# Patient Record
Sex: MALE | Race: WHITE | NOT HISPANIC OR LATINO
[De-identification: names, ages, dates, MRNs, and addresses within clinical notes are randomized per-mention and may not be internally consistent; named-entity substitution may affect disease eponyms.]

---

## 2018-12-24 ENCOUNTER — RECORD ABSTRACTING (OUTPATIENT)
Age: 53
End: 2018-12-24

## 2018-12-24 DIAGNOSIS — Z82.49 FAMILY HISTORY OF ISCHEMIC HEART DISEASE AND OTHER DISEASES OF THE CIRCULATORY SYSTEM: ICD-10-CM

## 2018-12-24 DIAGNOSIS — Z80.1 FAMILY HISTORY OF MALIGNANT NEOPLASM OF TRACHEA, BRONCHUS AND LUNG: ICD-10-CM

## 2019-01-02 ENCOUNTER — RECORD ABSTRACTING (OUTPATIENT)
Age: 54
End: 2019-01-02

## 2019-01-02 DIAGNOSIS — Z87.898 PERSONAL HISTORY OF OTHER SPECIFIED CONDITIONS: ICD-10-CM

## 2019-01-02 DIAGNOSIS — I10 ESSENTIAL (PRIMARY) HYPERTENSION: ICD-10-CM

## 2019-01-02 DIAGNOSIS — I24.9 ACUTE ISCHEMIC HEART DISEASE, UNSPECIFIED: ICD-10-CM

## 2019-01-02 DIAGNOSIS — Z99.89 DEPENDENCE ON OTHER ENABLING MACHINES AND DEVICES: ICD-10-CM

## 2019-01-02 DIAGNOSIS — Z78.9 OTHER SPECIFIED HEALTH STATUS: ICD-10-CM

## 2019-01-02 DIAGNOSIS — Z95.5 PRESENCE OF CORONARY ANGIOPLASTY IMPLANT AND GRAFT: ICD-10-CM

## 2019-01-02 DIAGNOSIS — E78.49 OTHER HYPERLIPIDEMIA: ICD-10-CM

## 2019-01-02 DIAGNOSIS — J30.0 VASOMOTOR RHINITIS: ICD-10-CM

## 2019-01-10 ENCOUNTER — APPOINTMENT (OUTPATIENT)
Dept: CARDIOLOGY | Facility: CLINIC | Age: 54
End: 2019-01-10
Payer: COMMERCIAL

## 2019-01-10 ENCOUNTER — NON-APPOINTMENT (OUTPATIENT)
Age: 54
End: 2019-01-10

## 2019-01-10 VITALS
BODY MASS INDEX: 38.51 KG/M2 | DIASTOLIC BLOOD PRESSURE: 64 MMHG | HEIGHT: 69 IN | WEIGHT: 260 LBS | SYSTOLIC BLOOD PRESSURE: 138 MMHG

## 2019-01-10 PROCEDURE — 93000 ELECTROCARDIOGRAM COMPLETE: CPT

## 2019-01-10 PROCEDURE — 99214 OFFICE O/P EST MOD 30 MIN: CPT

## 2019-01-10 PROCEDURE — 36415 COLL VENOUS BLD VENIPUNCTURE: CPT

## 2019-01-10 NOTE — HISTORY OF PRESENT ILLNESS
[FreeTextEntry1] :  Follow up office visit, for this 53 year-old gentleman, son of long-term patient Tyrone Jaramillo, seen after Acte ACS in Sept 2018. \par        Scenario: He was hospitalized 9/21/18 at [facility] with chest pain, ST changes, and elevated\par        troponin. He was transferred to [place] for cath:\par        100% mid RCA; received MIRTA with excellent result and ANA 3 flow.\par        Severe mid--LXC stenosis; received MIRTA to mid--lcx with good result\par        60% distal LAD; not addressed as felt nonobstructive\par        LV: EF 40% with inferior hypokinesis\par        Discharged on Brilinta 90 BID, ASA 81 and statin.\par        ==============================================\par        Previously:\par        Johnny reported that he was suffering from 2 conditions:\par        1)The first is a cough: His been present for a year it is nonproductive. He saw his primary care physician who obtained a chest x-ray and this was unremarkable. He is a 2 pack per day smoker but there is no wheezing or mucus production. He feels as if he chokes on his own secretions. There but no fevers or chills and no mucus production. Any phlegm is clear. ** I referred him to Pulmonary (Dr. Elio Travis) who diagnosed Centrilobular EMPHYSEMA, Sleep Apnea, Obesity, and cigarette addiction. He recommended full PFTs, CT of chest, and pulm stress test.\par        2) The second issue is peripheral edema. He was tried on hydrochlorothiazide without much success without rash. Because of this rash, he self discontinued hydrochlorothiazide, metformin, and simvastatin. He did retry at all 3 medications and the rash recurred. He is therefore unsure which of the 3 medications produced a rash. We started LASIX and this has helped.\par        3) He does have a history of hyperlipidemia and was prescribed simvastatin which he had discontinued (as above).\par        4) He is a 2 pack per day lifelong smoker. Tried Chantix for 3 months;no success--back to 2-3 ppd\par        5) He does drink. He was formerly a heavy drinker, then stop for 20 years, but resumed several years ago.\par        6) Prior to his Sept 2018 ACS, and based on his risk factor profile (Male, smoker, htn, hyperlipidemia, and positive family hx of CAD in his father), I had recommended stress testing for risk stratification. 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 No acute pleural or pulmonary pathology.\par        ECHOCARDIOGRAM (9/27/17)\par        Mild LV dilatation with nl EF\par        Grade 1 diastolic dysfunction\par        No AS. Mild MR. No pulm Htn,\par        **Venous Duplex (obtained due to edema) 11/2/17: Nl study. No venous insuff or DVT.\par        ================================================\par        Today: Approx. 3.5 months s/p ACS and MIRTA to mid-rca for acute 100% stenosis and stenting of mid LCX. Doing well. Will obtain risk stratifying stress echo as post PCI baseline and to confirm LAD lesion is nonobstructive

## 2019-01-10 NOTE — REASON FOR VISIT
[FreeTextEntry1] : 1. Multivessel CAD. \par 2. S/P ACS, MIRTA to RCA and LCX 9/21/18.\par 3. Hyperlipidemia. \par 4. Essential Hypertension.

## 2019-01-10 NOTE — PHYSICAL EXAM
[Click] : no click [Right Carotid Bruit] : no bruit heard over the right carotid [Left Carotid Bruit] : no bruit heard over the left carotid [Right Femoral Bruit] : no bruit heard over the right femoral artery [Left Femoral Bruit] : no bruit heard over the left femoral artery [Rt] : no varicose veins of the right leg [Lt] : no varicose veins of the left leg

## 2019-01-10 NOTE — ASSESSMENT
[FreeTextEntry1] :  Assessment: \par 1. S/P drug eluting coronary stent placement - Z95.5 (Primary)  \par 2. SOB (shortness of breath) - R06.02  \par 3. 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 Obstructive sleep apnea (adult) (pediatric) - G47.33  \par 5. Obesity (BMI 30-39.9) - E66.9  \par 6. Smoker - F17.200  \par 7. Alcohol abuse - F10.10  \par 8. Mixed hyperlipidemia - E78.2   \par  1) CAD, 3.5 months s/p ACS and s/p MIRTA X2 to mid RCA and LCX, with known nonobstructive distal LAD stenosis and mild LV dysfunction. Asymptomatic and clinically stable. Will plan Stress Echo before next visit. Will consider adding BB due to LV dysfunction, but hx of smoking/COPD may preclude this. Will plan 12 months of DAP therapy, due to MIRTA and smoking!\par 2) Smoking addiction--very poor prognosis with obesity, CAD, and inability to quit. Smoking excessively, still.\par 3) Hyperlipidemia, on maximum statin\par 4) COPD--centrilobar emphysema\par 5) Alcohol/substance abuse\par Prognosis poor due to CAD involving all 3 arteries and presence of LV dysfunction and he is smoking. This is very ominous. I was very clear to him that his continued smoking would more likely than not result in restenosis and a devastating heart attack. His entire prognosis is dependent on his ability to quit smoking. I have offered him Chantix, patches, and recommended he see our smoking cessation physicians. \par

## 2019-01-11 LAB
ALBUMIN SERPL ELPH-MCNC: 4.3 G/DL
ALP BLD-CCNC: 97 U/L
ALT SERPL-CCNC: 24 U/L
ANION GAP SERPL CALC-SCNC: 9 MMOL/L
AST SERPL-CCNC: 17 U/L
BASOPHILS # BLD AUTO: 0.04 K/UL
BASOPHILS NFR BLD AUTO: 0.4 %
BILIRUB SERPL-MCNC: 0.3 MG/DL
BUN SERPL-MCNC: 10 MG/DL
CALCIUM SERPL-MCNC: 9.1 MG/DL
CHLORIDE SERPL-SCNC: 106 MMOL/L
CHOLEST SERPL-MCNC: 119 MG/DL
CHOLEST/HDLC SERPL: 4 RATIO
CO2 SERPL-SCNC: 24 MMOL/L
CREAT SERPL-MCNC: 0.89 MG/DL
EOSINOPHIL # BLD AUTO: 0.29 K/UL
EOSINOPHIL NFR BLD AUTO: 2.8 %
GLUCOSE SERPL-MCNC: 171 MG/DL
HCT VFR BLD CALC: 44.9 %
HDLC SERPL-MCNC: 30 MG/DL
HGB BLD-MCNC: 14.4 G/DL
IMM GRANULOCYTES NFR BLD AUTO: 0.4 %
LDLC SERPL CALC-MCNC: 54 MG/DL
LYMPHOCYTES # BLD AUTO: 2.62 K/UL
LYMPHOCYTES NFR BLD AUTO: 25.5 %
MAN DIFF?: NORMAL
MCHC RBC-ENTMCNC: 31.3 PG
MCHC RBC-ENTMCNC: 32.1 GM/DL
MCV RBC AUTO: 97.6 FL
MONOCYTES # BLD AUTO: 0.52 K/UL
MONOCYTES NFR BLD AUTO: 5.1 %
NEUTROPHILS # BLD AUTO: 6.76 K/UL
NEUTROPHILS NFR BLD AUTO: 65.8 %
PLATELET # BLD AUTO: 202 K/UL
POTASSIUM SERPL-SCNC: 4.1 MMOL/L
PROT SERPL-MCNC: 6.8 G/DL
RBC # BLD: 4.6 M/UL
RBC # FLD: 14 %
SODIUM SERPL-SCNC: 139 MMOL/L
TRIGL SERPL-MCNC: 176 MG/DL
WBC # FLD AUTO: 10.27 K/UL

## 2019-04-04 RX ORDER — ASPIRIN ENTERIC COATED TABLETS 81 MG 81 MG/1
81 TABLET, DELAYED RELEASE ORAL DAILY
Refills: 0 | Status: ACTIVE | COMMUNITY

## 2019-04-04 NOTE — PHYSICAL EXAM
[General Appearance - Well Developed] : well developed [Normal Appearance] : normal appearance [Well Groomed] : well groomed [General Appearance - Well Nourished] : well nourished [No Deformities] : no deformities [General Appearance - In No Acute Distress] : no acute distress [Normal Conjunctiva] : the conjunctiva exhibited no abnormalities [Eyelids - No Xanthelasma] : the eyelids demonstrated no xanthelasmas [Normal Oral Mucosa] : normal oral mucosa [No Oral Pallor] : no oral pallor [No Oral Cyanosis] : no oral cyanosis [Normal Jugular Venous A Waves Present] : normal jugular venous A waves present [Normal Jugular Venous V Waves Present] : normal jugular venous V waves present [No Jugular Venous Sims A Waves] : no jugular venous sims A waves [Respiration, Rhythm And Depth] : normal respiratory rhythm and effort [Exaggerated Use Of Accessory Muscles For Inspiration] : no accessory muscle use [Auscultation Breath Sounds / Voice Sounds] : lungs were clear to auscultation bilaterally [Abdomen Soft] : soft [Abdomen Tenderness] : non-tender [Abdomen Mass (___ Cm)] : no abdominal mass palpated [Abnormal Walk] : normal gait [Gait - Sufficient For Exercise Testing] : the gait was sufficient for exercise testing [Nail Clubbing] : no clubbing of the fingernails [Cyanosis, Localized] : no localized cyanosis [Petechial Hemorrhages (___cm)] : no petechial hemorrhages [Skin Color & Pigmentation] : normal skin color and pigmentation [] : no rash [No Venous Stasis] : no venous stasis [Skin Lesions] : no skin lesions [No Skin Ulcers] : no skin ulcer [No Xanthoma] : no  xanthoma was observed [5th Left ICS - MCL] : palpated at the 5th LICS in the midclavicular line [Normal] : normal [Normal Rate] : normal [Rhythm Regular] : regular [Normal S1] : normal S1 [Normal S2] : normal S2 [No Murmur] : no murmurs heard [No Abnormalities] : the abdominal aorta was not enlarged and no bruit was heard [No Pitting Edema] : no pitting edema present [Click] : no click [Right Carotid Bruit] : no bruit heard over the right carotid [Left Carotid Bruit] : no bruit heard over the left carotid [Right Femoral Bruit] : no bruit heard over the right femoral artery [Left Femoral Bruit] : no bruit heard over the left femoral artery [Rt] : no varicose veins of the right leg [Lt] : no varicose veins of the left leg

## 2019-04-04 NOTE — HISTORY OF PRESENT ILLNESS
[FreeTextEntry1] :  Follow up office visit, for this 53 year-old gentleman, son of long-term patient Tyrone Jaramillo, seen after Acte ACS in Sept 2018. \par        Scenario: He was hospitalized 9/21/18 at [facility] with chest pain, ST changes, and elevated\par        troponin. He was transferred to [place] for cath:\par        100% mid RCA; received MIRTA with excellent result and ANA 3 flow.\par        Severe mid--LXC stenosis; received MIRTA to mid--lcx with good result\par        60% distal LAD; not addressed as felt nonobstructive\par        LV: EF 40% with inferior hypokinesis\par        Discharged on Brilinta 90 BID, ASA 81 and statin.\par        ==============================================\par        Previously:\par        Johnny reported that he was suffering from 2 conditions:\par        1)The first is a cough: His been present for a year it is nonproductive. He saw his primary care physician who obtained a chest x-ray and this was unremarkable. He is a 2 pack per day smoker but there is no wheezing or mucus production. He feels as if he chokes on his own secretions. There but no fevers or chills and no mucus production. Any phlegm is clear. ** I referred him to Pulmonary (Dr. Elio Travis) who diagnosed Centrilobular EMPHYSEMA, Sleep Apnea, Obesity, and cigarette addiction. He recommended full PFTs, CT of chest, and pulm stress test.\par        2) The second issue is peripheral edema. He was tried on hydrochlorothiazide without much success without rash. Because of this rash, he self discontinued hydrochlorothiazide, metformin, and simvastatin. He did retry at all 3 medications and the rash recurred. He is therefore unsure which of the 3 medications produced a rash. We started LASIX and this has helped.\par        3) He does have a history of hyperlipidemia and was prescribed simvastatin which he had discontinued (as above).\par        4) He is a 2 pack per day lifelong smoker. Tried Chantix for 3 months;no success--back to 2-3 ppd\par        5) He does drink. He was formerly a heavy drinker, then stop for 20 years, but resumed several years ago.\par        6) Prior to his Sept 2018 ACS, and based on his risk factor profile (Male, smoker, htn, hyperlipidemia, and positive family hx of CAD in his father), I had recommended stress testing for risk stratification. 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 No acute pleural or pulmonary pathology.\par        ECHOCARDIOGRAM (9/27/17)\par        Mild LV dilatation with nl EF\par        Grade 1 diastolic dysfunction\par        No AS. Mild MR. No pulm Htn,\par        **Venous Duplex (obtained due to edema) 11/2/17: Nl study. No venous insuff or DVT.\par        ================================================\par        Today: Approx. 3.5 months s/p ACS and MIRTA to mid-rca for acute 100% stenosis and stenting of mid LCX. Doing well. Will obtain risk stratifying stress echo as post PCI baseline and to confirm LAD lesion is nonobstructive

## 2019-04-04 NOTE — ASSESSMENT
[Coronary Atherosclerosis of Native Coronary Artery (414.01\I25.10)] : total [FreeTextEntry1] :  Assessment: \par 1. S/P drug eluting coronary stent placement - Z95.5 (Primary)  \par 2. SOB (shortness of breath) - R06.02  \par 3. 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 Obstructive sleep apnea (adult) (pediatric) - G47.33  \par 5. Obesity (BMI 30-39.9) - E66.9  \par 6. Smoker - F17.200  \par 7. Alcohol abuse - F10.10  \par 8. Mixed hyperlipidemia - E78.2   \par  1) CAD, 3.5 months s/p ACS and s/p MIRTA X2 to mid RCA and LCX, with known nonobstructive distal LAD stenosis and mild LV dysfunction. Asymptomatic and clinically stable. Will plan Stress Echo before next visit. Will consider adding BB due to LV dysfunction, but hx of smoking/COPD may preclude this. Will plan 12 months of DAP therapy, due to MIRTA and smoking!\par 2) Smoking addiction--very poor prognosis with obesity, CAD, and inability to quit. Smoking excessively, still.\par 3) Hyperlipidemia, on maximum statin\par 4) COPD--centrilobar emphysema\par 5) Alcohol/substance abuse\par Prognosis poor due to CAD involving all 3 arteries and presence of LV dysfunction and he is smoking. This is very ominous. I was very clear to him that his continued smoking would more likely than not result in restenosis and a devastating heart attack. His entire prognosis is dependent on his ability to quit smoking. I have offered him Chantix, patches, and recommended he see our smoking cessation physicians. \par

## 2019-04-11 ENCOUNTER — APPOINTMENT (OUTPATIENT)
Dept: CARDIOLOGY | Facility: CLINIC | Age: 54
End: 2019-04-11

## 2019-05-09 ENCOUNTER — APPOINTMENT (OUTPATIENT)
Dept: CARDIOLOGY | Facility: CLINIC | Age: 54
End: 2019-05-09
Payer: COMMERCIAL

## 2019-05-09 VITALS
DIASTOLIC BLOOD PRESSURE: 72 MMHG | WEIGHT: 260 LBS | BODY MASS INDEX: 38.51 KG/M2 | SYSTOLIC BLOOD PRESSURE: 142 MMHG | HEIGHT: 69 IN

## 2019-05-09 PROCEDURE — 93000 ELECTROCARDIOGRAM COMPLETE: CPT

## 2019-05-09 PROCEDURE — 99214 OFFICE O/P EST MOD 30 MIN: CPT

## 2019-05-09 NOTE — HISTORY OF PRESENT ILLNESS
[FreeTextEntry1] :  Follow up office visit, for this 53 year-old gentleman, son of long-term patient Tyrone Jaramillo, seen after Acte ACS in Sept 2018. \par        Scenario: He was hospitalized 9/21/18 at [facility] with chest pain, ST changes, and elevated\par        troponin. He was transferred to [place] for cath:\par        100% mid RCA; received MIRTA with excellent result and ANA 3 flow.\par        Severe mid--LXC stenosis; received MIRTA to mid--lcx with good result\par        60% distal LAD; not addressed as felt nonobstructive\par        LV: EF 40% with inferior hypokinesis\par        Discharged on Brilinta 90 BID, ASA 81 and statin.\par        ==============================================\par        Previously:\par        Johnny reported that he was suffering from 2 conditions:\par        1)The first is a cough: His been present for a year it is nonproductive. He saw his primary care physician who obtained a chest x-ray and this was unremarkable. He is a 2 pack per day smoker but there is no wheezing or mucus production. He feels as if he chokes on his own secretions. There but no fevers or chills and no mucus production. Any phlegm is clear. ** I referred him to Pulmonary (Dr. Elio Travis) who diagnosed Centrilobular EMPHYSEMA, Sleep Apnea, Obesity, and cigarette addiction. He recommended full PFTs, CT of chest, and pulm stress test.\par        2) The second issue is peripheral edema. He was tried on hydrochlorothiazide without much success without rash. Because of this rash, he self discontinued hydrochlorothiazide, metformin, and simvastatin. He did retry at all 3 medications and the rash recurred. He is therefore unsure which of the 3 medications produced a rash. We started LASIX and this has helped.\par        3) He does have a history of hyperlipidemia and was prescribed simvastatin which he had discontinued (as above).\par        4) He is a 2 pack per day lifelong smoker. Tried Chantix for 3 months;no success--back to 2-3 ppd\par        5) He does drink. He was formerly a heavy drinker, then stop for 20 years, but resumed several years ago.\par        6) Prior to his Sept 2018 ACS, and based on his risk factor profile (Male, smoker, htn, hyperlipidemia, and positive family hx of CAD in his father), I had recommended stress testing for risk stratification. 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 No acute pleural or pulmonary pathology.\par        ECHOCARDIOGRAM (9/27/17)\par        Mild LV dilatation with nl EF\par        Grade 1 diastolic dysfunction\par        No AS. Mild MR. No pulm Htn,\par        **Venous Duplex (obtained due to edema) 11/2/17: Nl study. No venous insuff or DVT.\par        ================================================\par        Today: Approx. 8 months s/p ACS and MIRTA to mid-r ca for acute 100% stenosis and stenting of mid LCX.  When I last saw him, he as given a prescription for a f/u stress echo at [facility],  to reassess\par LV function and to confirm absence of persistent ischemia after PCI.\par ***He is not doing well. He went to [place] to take care of this 85-year-old father and developed bronchitis and worsening of his emphysema. He was treated with Augmentin, steroids, and nebulizers. He has not seen his local pulmonologist, Dr. Musa Travis.\par -He was scheduled for his stress test this past Monday on 6 May, but it was postponed for a variety of reasons including difficulty breathing.\par -He continues to smoke cigarettes and is both unwilling and really not interested in quitting, although he recognizes this is hurting him and contributing to his emphysema and heart disease\par

## 2019-05-09 NOTE — PHYSICAL EXAM
[Normal Appearance] : normal appearance [General Appearance - Well Developed] : well developed [General Appearance - Well Nourished] : well nourished [Well Groomed] : well groomed [No Deformities] : no deformities [General Appearance - In No Acute Distress] : no acute distress [Normal Conjunctiva] : the conjunctiva exhibited no abnormalities [Eyelids - No Xanthelasma] : the eyelids demonstrated no xanthelasmas [Normal Oral Mucosa] : normal oral mucosa [No Oral Pallor] : no oral pallor [No Oral Cyanosis] : no oral cyanosis [Normal Jugular Venous A Waves Present] : normal jugular venous A waves present [Normal Jugular Venous V Waves Present] : normal jugular venous V waves present [No Jugular Venous Sims A Waves] : no jugular venous sims A waves [Exaggerated Use Of Accessory Muscles For Inspiration] : no accessory muscle use [Respiration, Rhythm And Depth] : normal respiratory rhythm and effort [Abdomen Soft] : soft [Abdomen Tenderness] : non-tender [Abdomen Mass (___ Cm)] : no abdominal mass palpated [Nail Clubbing] : no clubbing of the fingernails [Gait - Sufficient For Exercise Testing] : the gait was sufficient for exercise testing [Abnormal Walk] : normal gait [Cyanosis, Localized] : no localized cyanosis [Petechial Hemorrhages (___cm)] : no petechial hemorrhages [Skin Color & Pigmentation] : normal skin color and pigmentation [] : no rash [No Venous Stasis] : no venous stasis [Skin Lesions] : no skin lesions [No Xanthoma] : no  xanthoma was observed [No Skin Ulcers] : no skin ulcer [5th Left ICS - MCL] : palpated at the 5th LICS in the midclavicular line [Normal] : normal [Normal Rate] : normal [Normal S1] : normal S1 [Normal S2] : normal S2 [No Murmur] : no murmurs heard [No Pitting Edema] : no pitting edema present [No Abnormalities] : the abdominal aorta was not enlarged and no bruit was heard [FreeTextEntry1] : there is expiratory wheezing [Premature Beats] : regular with premature beats [Click] : no click [Right Carotid Bruit] : no bruit heard over the right carotid [Left Carotid Bruit] : no bruit heard over the left carotid [Right Femoral Bruit] : no bruit heard over the right femoral artery [Left Femoral Bruit] : no bruit heard over the left femoral artery [Rt] : no varicose veins of the right leg [Lt] : no varicose veins of the left leg

## 2019-05-09 NOTE — ASSESSMENT
[Coronary Atherosclerosis of Native Coronary Artery (414.01\I25.10)] : total [FreeTextEntry1] :  Assessment: \par 1. S/P drug eluting coronary stent placement - Z95.5 (Primary)  \par 2. SOB (shortness of breath) - R06.02  \par 3. 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 Obstructive sleep apnea (adult) (pediatric) - G47.33  \par 5. Obesity (BMI 30-39.9) - E66.9  \par 6. Smoker - F17.200  \par 7. Alcohol abuse - F10.10  \par 8. Mixed hyperlipidemia - E78.2   \par  1) CAD, now 8  months s/p ACS and s/p MIRTA X2 to mid RCA and LCX, with known nonobstructive distal LAD stenosis and mild LV dysfunction. Asymptomatic and clinically stable. \par -Will reschedule stress echo \par -Will consider adding BB due to LV dysfunction and PVCs-- may preclude this. \par -Will plan 12 months of DAP therapy, due to MIRTA and smoking!\par 2) Smoking addiction--very poor prognosis with obesity, CAD, and inability to quit. Smoking excessively, still.\par 3) Hyperlipidemia, on maximum statin\par 4) COPD--centrilobar emphysema\par 5) Alcohol/substance abuse\par 6)New and frequent isolated PVCs. This probably relates to ongoing hypoxemia related to his decompensated COPD. Will have him followup with pulmonary as soon as possible.\par \par Additional comments: I am very concerned about this patient's well being. He is clearly worse with each subsequent visit. He continues to smoke cigarettes heavily and is suffering from severe emphysema with hypoxemia. Following acute coronary syndrome and coronary artery stenting, he continues to smoke and is having ventricular ectopy. His COPD precludes protective beta blocker therapy. He has significant LV dysfunction and is not on a beta blocker because of his emphysema. All this limits his prognosis. I have discussed this in detail with Tanya Clint who is aware of these issues. He promises to follow up with his pulmonologist. I will proceed with a stress test in several weeks, once his emphysema has stabilized.\par

## 2019-05-10 LAB
ALBUMIN SERPL ELPH-MCNC: 4.4 G/DL
ALP BLD-CCNC: 93 U/L
ALT SERPL-CCNC: 28 U/L
ANION GAP SERPL CALC-SCNC: 12 MMOL/L
AST SERPL-CCNC: 17 U/L
BASOPHILS # BLD AUTO: 0.07 K/UL
BASOPHILS NFR BLD AUTO: 0.6 %
BILIRUB SERPL-MCNC: 0.2 MG/DL
BUN SERPL-MCNC: 12 MG/DL
CALCIUM SERPL-MCNC: 9.4 MG/DL
CHLORIDE SERPL-SCNC: 105 MMOL/L
CHOLEST SERPL-MCNC: 176 MG/DL
CHOLEST/HDLC SERPL: 4.6 RATIO
CO2 SERPL-SCNC: 25 MMOL/L
CREAT SERPL-MCNC: 0.89 MG/DL
EOSINOPHIL # BLD AUTO: 1.04 K/UL
EOSINOPHIL NFR BLD AUTO: 9.1 %
GLUCOSE SERPL-MCNC: 118 MG/DL
HCT VFR BLD CALC: 52.1 %
HDLC SERPL-MCNC: 38 MG/DL
HGB BLD-MCNC: 16.7 G/DL
IMM GRANULOCYTES NFR BLD AUTO: 0.8 %
LDLC SERPL CALC-MCNC: 101 MG/DL
LYMPHOCYTES # BLD AUTO: 2.92 K/UL
LYMPHOCYTES NFR BLD AUTO: 25.5 %
MAN DIFF?: NORMAL
MCHC RBC-ENTMCNC: 31.9 PG
MCHC RBC-ENTMCNC: 32.1 GM/DL
MCV RBC AUTO: 99.4 FL
MONOCYTES # BLD AUTO: 0.62 K/UL
MONOCYTES NFR BLD AUTO: 5.4 %
NEUTROPHILS # BLD AUTO: 6.69 K/UL
NEUTROPHILS NFR BLD AUTO: 58.6 %
PLATELET # BLD AUTO: 215 K/UL
POTASSIUM SERPL-SCNC: 4.3 MMOL/L
PROT SERPL-MCNC: 7.4 G/DL
RBC # BLD: 5.24 M/UL
RBC # FLD: 13.5 %
SODIUM SERPL-SCNC: 142 MMOL/L
TRIGL SERPL-MCNC: 185 MG/DL
WBC # FLD AUTO: 11.43 K/UL

## 2019-05-12 ENCOUNTER — RESULT CHARGE (OUTPATIENT)
Age: 54
End: 2019-05-12

## 2019-05-13 ENCOUNTER — RESULT REVIEW (OUTPATIENT)
Age: 54
End: 2019-05-13

## 2019-05-13 ENCOUNTER — APPOINTMENT (OUTPATIENT)
Dept: PULMONOLOGY | Facility: CLINIC | Age: 54
End: 2019-05-13
Payer: COMMERCIAL

## 2019-05-13 VITALS
BODY MASS INDEX: 37.62 KG/M2 | SYSTOLIC BLOOD PRESSURE: 104 MMHG | OXYGEN SATURATION: 98 % | WEIGHT: 254 LBS | HEART RATE: 78 BPM | HEIGHT: 69 IN | DIASTOLIC BLOOD PRESSURE: 80 MMHG

## 2019-05-13 PROCEDURE — 99215 OFFICE O/P EST HI 40 MIN: CPT

## 2019-05-13 NOTE — PROCEDURE
[FreeTextEntry1] : FEV1 2.57 L  70% Predicted\par 75%  FEV1/FVC\par mild obstruction. Obstruction slightly worse compared to PFTs performed on 12/18/2017\par

## 2019-05-13 NOTE — HISTORY OF PRESENT ILLNESS
[Current] : is a current smoker [AHI: ___ per hour] : Apnea-hypopnea index:  [unfilled] per hour [Cody desatuation%: ___] : Cody desaturation:  [unfilled]% [Date: ___] : Date of most recent diagnostic polysomnogram: [unfilled] [FreeTextEntry2] : 2-2.5 ppd [FreeTextEntry1] : BiLevel 24/20: unable to download from SD card. The only info given from report is that he has a 57% usage in the past 30 days

## 2019-05-13 NOTE — PHYSICAL EXAM
[General Appearance - Well Developed] : well developed [Normal Appearance] : normal appearance [General Appearance - In No Acute Distress] : no acute distress [Normal Conjunctiva] : the conjunctiva exhibited no abnormalities [Low Lying Soft Palate] : low lying soft palate [Elongated Uvula] : elongated uvula [Neck Appearance] : the appearance of the neck was normal [Enlarged Base of the Tongue] : enlargement of the base of the tongue [Neck Cervical Mass (___cm)] : no neck mass was observed [] : the neck was supple [Murmurs] : no murmurs present [Heart Rate And Rhythm] : heart rate and rhythm were normal [Heart Sounds] : normal S1 and S2 [Bowel Sounds] : normal bowel sounds [Edema] : no peripheral edema present [Abdomen Soft] : soft [Nail Clubbing] : no clubbing of the fingernails [Abnormal Walk] : normal gait [Cyanosis, Localized] : no localized cyanosis [No Focal Deficits] : no focal deficits [Deep Tendon Reflexes (DTR)] : deep tendon reflexes were 2+ and symmetric [Cranial Nerves] : cranial nerves 2-12 were intact [Affect] : the affect was normal [Oriented To Time, Place, And Person] : oriented to person, place, and time [FreeTextEntry1] : Mallampati IV

## 2019-05-15 ENCOUNTER — MEDICATION RENEWAL (OUTPATIENT)
Age: 54
End: 2019-05-15

## 2019-05-15 RX ORDER — UMECLIDINIUM BROMIDE AND VILANTEROL TRIFENATATE 62.5; 25 UG/1; UG/1
62.5-25 POWDER RESPIRATORY (INHALATION)
Qty: 1 | Refills: 5 | Status: DISCONTINUED | COMMUNITY
Start: 2019-05-13 | End: 2019-05-15

## 2019-05-23 ENCOUNTER — RESULT REVIEW (OUTPATIENT)
Age: 54
End: 2019-05-23

## 2019-06-13 ENCOUNTER — APPOINTMENT (OUTPATIENT)
Dept: CARDIOLOGY | Facility: CLINIC | Age: 54
End: 2019-06-13
Payer: COMMERCIAL

## 2019-06-13 VITALS
SYSTOLIC BLOOD PRESSURE: 130 MMHG | WEIGHT: 259 LBS | BODY MASS INDEX: 38.36 KG/M2 | DIASTOLIC BLOOD PRESSURE: 82 MMHG | HEIGHT: 69 IN

## 2019-06-13 PROCEDURE — 99214 OFFICE O/P EST MOD 30 MIN: CPT

## 2019-06-13 RX ORDER — ATORVASTATIN CALCIUM 80 MG/1
80 TABLET, FILM COATED ORAL DAILY
Refills: 0 | Status: DISCONTINUED | COMMUNITY
End: 2019-06-13

## 2019-06-13 NOTE — PHYSICAL EXAM
[General Appearance - Well Developed] : well developed [Normal Appearance] : normal appearance [No Deformities] : no deformities [Well Groomed] : well groomed [General Appearance - Well Nourished] : well nourished [Normal Conjunctiva] : the conjunctiva exhibited no abnormalities [General Appearance - In No Acute Distress] : no acute distress [Eyelids - No Xanthelasma] : the eyelids demonstrated no xanthelasmas [No Oral Pallor] : no oral pallor [Normal Oral Mucosa] : normal oral mucosa [Normal Jugular Venous A Waves Present] : normal jugular venous A waves present [No Oral Cyanosis] : no oral cyanosis [Normal Jugular Venous V Waves Present] : normal jugular venous V waves present [Respiration, Rhythm And Depth] : normal respiratory rhythm and effort [No Jugular Venous Sims A Waves] : no jugular venous sims A waves [Exaggerated Use Of Accessory Muscles For Inspiration] : no accessory muscle use [Abdomen Soft] : soft [Abdomen Mass (___ Cm)] : no abdominal mass palpated [Abdomen Tenderness] : non-tender [Nail Clubbing] : no clubbing of the fingernails [Abnormal Walk] : normal gait [Gait - Sufficient For Exercise Testing] : the gait was sufficient for exercise testing [Skin Color & Pigmentation] : normal skin color and pigmentation [Cyanosis, Localized] : no localized cyanosis [Petechial Hemorrhages (___cm)] : no petechial hemorrhages [Skin Lesions] : no skin lesions [] : no rash [No Venous Stasis] : no venous stasis [No Skin Ulcers] : no skin ulcer [No Xanthoma] : no  xanthoma was observed [Premature Beats] : regular with premature beats [Normal] : normal [5th Left ICS - MCL] : palpated at the 5th LICS in the midclavicular line [Normal Rate] : normal [Normal S1] : normal S1 [Normal S2] : normal S2 [No Murmur] : no murmurs heard [No Abnormalities] : the abdominal aorta was not enlarged and no bruit was heard [No Pitting Edema] : no pitting edema present [FreeTextEntry1] : there is expiratory wheezing [Click] : no click [Right Carotid Bruit] : no bruit heard over the right carotid [Left Carotid Bruit] : no bruit heard over the left carotid [Right Femoral Bruit] : no bruit heard over the right femoral artery [Left Femoral Bruit] : no bruit heard over the left femoral artery [Lt] : no varicose veins of the left leg [Rt] : no varicose veins of the right leg

## 2019-06-13 NOTE — ASSESSMENT
[Coronary Atherosclerosis of Native Coronary Artery (414.01\I25.10)] : total [FreeTextEntry1] :  Assessment: \par 1. S/P drug eluting coronary stent placement - Z95.5 (Primary)  \par 2. SOB (shortness of breath) - R06.02  \par 3. 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 Obstructive sleep apnea (adult) (pediatric) - G47.33  \par 5. Obesity (BMI 30-39.9) - E66.9  \par 6. Smoker - F17.200  \par 7. Alcohol abuse - F10.10  \par 8. Mixed hyperlipidemia - E78.2   \par \par Discussion:\par  1) CAD, now 9  months s/p ACS and s/p MIRTA X2 to mid RCA and LCX in Dec 2018, with known nonobstructive distal LAD stenosis and mild LV dysfunction. Asymptomatic .  Stress test is of limited diagnostic help due to poor imaging. However, there were no symptoms of chest discomfort and no ST-T changes  to the heart rate attained. I  consider this to be a low risk stress test. Because of the limited imaging via u/s due to his COPD, will consider a nuclear stress test if further testing is indicated down the road. For now, will continue medical therapy after coronary revascularization last fall.To plan to change to plavix next visit, and consider stopping DAP thereafter\par \par 2) Smoking addiction--very poor prognosis with obesity, CAD, and inability to quit. Smoking excessively, still. I have encouraged him to retry Chantix; he will consider. I explained to him, in layman's terms, that if he continues to smoke, he will die. His COPD and sleep apnea (for which he uses CPAP at night) are very severe and I have expressed concerns over his prognosis and mortality.\par 3) Hyperlipidemia, on maximum statin\par 4) COPD--centrilobar emphysema, advanced. Very ominous prognosis due to his continued smoking as well as the severity of his already existing emphysema. To followup with pulmonary. \par 5) Alcohol/substance abuse\par 6)New and frequent isolated PVCs. This probably relates to ongoing hypoxemia related to his decompensated COPD. Will have him followup with pulmonary as soon as possible.\par \par Additional comments: I am very concerned about this patient's well being. He is clearly worse with each subsequent visit. He continues to smoke cigarettes heavily and is suffering from severe emphysema with hypoxemia. Following acute coronary syndrome and coronary artery stenting, he continues to smoke and is having ventricular ectopy. His COPD precludes protective beta blocker therapy. He has significant LV dysfunction and is not on a beta blocker because of his emphysema. All this limits his prognosis. I have discussed this in detail with Mr. Jaramillo who is aware of these issues. He promises to follow up with his pulmonologist. \par

## 2019-06-13 NOTE — HISTORY OF PRESENT ILLNESS
[FreeTextEntry1] :  Follow up office visit, for this 53 year-old gentleman, son of long-term patient Tyrone Jaramillo, seen after Acte ACS in Sept 2018. \par        Scenario: He was hospitalized 9/21/18 at [facility] with chest pain, ST changes, and elevated\par        troponin. He was transferred to [place] for cath:\par        100% mid RCA; received MIRTA with excellent result and ANA 3 flow.\par        Severe mid--LXC stenosis; received MIRTA to mid--lcx with good result\par        60% distal LAD; not addressed as felt nonobstructive\par        LV: EF 40% with inferior hypokinesis\par        Discharged on Brilinta 90 BID, ASA 81 and statin.\par        ==============================================\par      \par        Johnny is suffering from several conditions:\par        1)The first is a cough , and significant dyspnea.  ** I referred him to Pulmonary (Dr. Elio Travis) who diagnosed Centrilobular EMPHYSEMA, Sleep Apnea, Obesity, and cigarette addiction. He recommended full PFTs, CT of chest, and pulm stress test.\par        2) The second issue is peripheral edema. He was tried on hydrochlorothiazide without much success without rash. Because of this rash, he self discontinued hydrochlorothiazide, metformin, and simvastatin. He did retry at all 3 medications and the rash recurred. He is therefore unsure which of the 3 medications produced a rash. We started Lasix and this has helped.\par        3) He does have a history of hyperlipidemia and was prescribed simvastatin which he had discontinued (as above).\par        4) He is a 2 pack per day lifelong smoker. Tried Chantix for 3 months;no success--back to 2-3 PPD. He continues to smoke and does not manifest interest in quitting.\par        5) He does drink. He was formerly a heavy drinker, then stop for 20 years, but resumed several years ago.\par        6) Prior to his Sept 2018 ACS, and based on his risk factor profile (Male, smoker, htn, hyperlipidemia, and positive family hx of CAD in his father), I had recommended stress testing for risk stratification. 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 No acute pleural or pulmonary pathology.\par        ECHOCARDIOGRAM (9/27/17)\par        Mild LV dilatation with nl EF\par        Grade 1 diastolic dysfunction\par        No AS. Mild MR. No pulm Htn,\par        **Venous Duplex (obtained due to edema) 11/2/17: Nl study. No venous insuff or DVT.\par        ================================================\par  He did have f/u Stress Echo  (May 24, 2919):\par For just 4 mins, 52 secs, to HR of just 131 (79% MPHR)--test stopped due to Fatigue, dyspnea\par No diagnostic ST changes to HR obtained.\par Frequent PVCs\par Normal resting wall motion\par Technically limited Imaging due to body habitus and COPD: Resting \par   and post-exercise echo images are of marginal quality \par ------------------------------------------------------------------------------------------------------------------------------\par \par \par \par \par ***He is not doing well. He went to [place] to take care of this 85-year-old father and developed bronchitis and worsening of his emphysema. He was treated with Augmentin, steroids, and nebulizers. He has not seen his local pulmonologist, Dr. Musa Travis.\par -He was scheduled for his stress test this past Monday on 6 May, but it was postponed for a variety of reasons including difficulty breathing.\par -He continues to smoke cigarettes and is both unwilling and really not interested in quitting, although he recognizes this is hurting him and contributing to his emphysema and heart disease\par

## 2019-06-27 ENCOUNTER — APPOINTMENT (OUTPATIENT)
Dept: PULMONOLOGY | Facility: CLINIC | Age: 54
End: 2019-06-27
Payer: COMMERCIAL

## 2019-06-27 VITALS
HEART RATE: 42 BPM | DIASTOLIC BLOOD PRESSURE: 82 MMHG | OXYGEN SATURATION: 98 % | SYSTOLIC BLOOD PRESSURE: 114 MMHG | WEIGHT: 259 LBS | HEIGHT: 69 IN | BODY MASS INDEX: 38.36 KG/M2

## 2019-06-27 DIAGNOSIS — J43.9 EMPHYSEMA, UNSPECIFIED: ICD-10-CM

## 2019-06-27 DIAGNOSIS — R06.02 SHORTNESS OF BREATH: ICD-10-CM

## 2019-06-27 DIAGNOSIS — G47.33 OBSTRUCTIVE SLEEP APNEA (ADULT) (PEDIATRIC): ICD-10-CM

## 2019-06-27 PROCEDURE — 99407 BEHAV CHNG SMOKING > 10 MIN: CPT

## 2019-06-27 PROCEDURE — 94010 BREATHING CAPACITY TEST: CPT

## 2019-06-27 PROCEDURE — 99214 OFFICE O/P EST MOD 30 MIN: CPT | Mod: 25

## 2019-06-27 NOTE — END OF VISIT
[FreeTextEntry3] : above was discussed at length with the patient and had an excellent understanding of the issues.\par Patient will return for f/u in 4 weeks.

## 2019-06-27 NOTE — REVIEW OF SYSTEMS
[EDS: ESS=____] : daytime somnolence: ESS=[unfilled] [Nasal Congestion] : nasal congestion [Fatigue] : fatigue [Postnasal Drip] : postnasal drip [Obesity] : obesity [Shortness Of Breath] : shortness of breath [Negative] : Psychiatric

## 2019-06-27 NOTE — PHYSICAL EXAM
[General Appearance - Well Developed] : well developed [General Appearance - In No Acute Distress] : no acute distress [Normal Appearance] : normal appearance [Normal Conjunctiva] : the conjunctiva exhibited no abnormalities [Low Lying Soft Palate] : low lying soft palate [Enlarged Base of the Tongue] : enlargement of the base of the tongue [Erythema] : erythema of the pharynx [IV] : IV [Jugular Venous Distention Increased] : there was no jugular-venous distention [Murmurs] : no murmurs [Heart Rate And Rhythm] : heart rate was normal and rhythm regular [Heart Sounds] : normal S1 and S2 [Exaggerated Use Of Accessory Muscles For Inspiration] : no accessory muscle use [Bowel Sounds] : normal bowel sounds [Abdomen Soft] : soft [Abdomen Tenderness] : non-tender [Abnormal Walk] : normal gait [Nail Clubbing] : no clubbing of the fingernails [Cyanosis, Localized] : no localized cyanosis [] : no rash [Cranial Nerves] : cranial nerves 2-12 were intact [Deep Tendon Reflexes (DTR)] : deep tendon reflexes were 2+ and symmetric [No Focal Deficits] : no focal deficits [Oriented To Time, Place, And Person] : oriented to person, place, and time [Affect] : the affect was normal [FreeTextEntry1] : expiratory wheeze cleared with cough

## 2019-06-27 NOTE — HISTORY OF PRESENT ILLNESS
[Date: ___] : Date of most recent diagnostic polysomnogram: [unfilled] [AHI: ___ per hour] : Apnea-hypopnea index:  [unfilled] per hour [Cody desatuation%: ___] : Cody desaturation:  [unfilled]% [% Days used: ____] : Days used: [unfilled] % [% Days used > 4 hrs: ____] : Days used > 4 hrs: [unfilled] % [Therapy based AHI: ___ /hr] : Therapy based AHI: [unfilled] / hr [FreeTextEntry1] : Mr. JOE is a 53 year male who presents for follow up of severe sleep apnea. He continues to complain of shortness of breath and states "my whole body hurts".. Cough has improved.  He recently had a stress test. He's still smoking 2-2.4 packs of cigarettes/day. \par He is using his BiPAP machine I max 25/ E min 14, PS 4 every night with a FF mask. The patient goes to bed between 11-12 and wakes up to start his day between 7-8 am. He wakes up to adjust his machine various times because his machine pressures are so high. \par \par \par

## 2019-07-31 ENCOUNTER — APPOINTMENT (OUTPATIENT)
Dept: PULMONOLOGY | Facility: CLINIC | Age: 54
End: 2019-07-31

## 2019-09-12 PROBLEM — I25.10 CORONARY ARTERY DISEASE: Status: ACTIVE | Noted: 2019-01-10

## 2019-09-12 PROBLEM — J43.9 EMPHYSEMA OF LUNG: Status: ACTIVE | Noted: 2019-06-06

## 2019-09-12 PROBLEM — J44.9 CHRONIC OBSTRUCTIVE PULMONARY DISEASE: Status: ACTIVE | Noted: 2019-09-12

## 2019-09-19 ENCOUNTER — APPOINTMENT (OUTPATIENT)
Dept: CARDIOLOGY | Facility: CLINIC | Age: 54
End: 2019-09-19
Payer: COMMERCIAL

## 2019-09-19 ENCOUNTER — NON-APPOINTMENT (OUTPATIENT)
Age: 54
End: 2019-09-19

## 2019-09-19 VITALS
SYSTOLIC BLOOD PRESSURE: 118 MMHG | BODY MASS INDEX: 38.36 KG/M2 | WEIGHT: 259 LBS | DIASTOLIC BLOOD PRESSURE: 80 MMHG | HEIGHT: 69 IN

## 2019-09-19 VITALS — SYSTOLIC BLOOD PRESSURE: 104 MMHG | DIASTOLIC BLOOD PRESSURE: 70 MMHG

## 2019-09-19 DIAGNOSIS — J44.9 CHRONIC OBSTRUCTIVE PULMONARY DISEASE, UNSPECIFIED: ICD-10-CM

## 2019-09-19 DIAGNOSIS — I25.10 ATHEROSCLEROTIC HEART DISEASE OF NATIVE CORONARY ARTERY W/OUT ANGINA PECTORIS: ICD-10-CM

## 2019-09-19 DIAGNOSIS — J43.9 EMPHYSEMA, UNSPECIFIED: ICD-10-CM

## 2019-09-19 PROCEDURE — 93000 ELECTROCARDIOGRAM COMPLETE: CPT

## 2019-09-19 PROCEDURE — 99214 OFFICE O/P EST MOD 30 MIN: CPT

## 2019-09-19 PROCEDURE — 36415 COLL VENOUS BLD VENIPUNCTURE: CPT

## 2019-09-19 RX ORDER — SILDENAFIL 100 MG/1
100 TABLET, FILM COATED ORAL
Qty: 12 | Refills: 5 | Status: ACTIVE | COMMUNITY
Start: 2019-09-19 | End: 1900-01-01

## 2019-09-19 RX ORDER — TICAGRELOR 90 MG/1
90 TABLET ORAL TWICE DAILY
Refills: 0 | Status: DISCONTINUED | COMMUNITY
End: 2019-09-19

## 2019-09-19 NOTE — ASSESSMENT
[FreeTextEntry1] :  Assessment: \par 1. S/P drug eluting coronary stent placement - Z95.5 (Primary)  \par 2. SOB (shortness of breath) - R06.02  \par 3. 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 Obstructive sleep apnea (adult) (pediatric) - G47.33  \par 5. Obesity (BMI 30-39.9) - E66.9  \par 6. Smoker - F17.200  \par 7. Alcohol abuse - F10.10  \par 8. Mixed hyperlipidemia - E78.2   \par \par Discussion:\par  1) CAD, 1 YEAR s/p ACS and s/p MIRTA X2 to mid RCA and LCX in Dec 2018, with known nonobstructive distal LAD stenosis and mild LV dysfunction. Asymptomatic .  Stress test is of limited diagnostic help due to poor imaging. However, there were no symptoms of chest discomfort and no ST-T changes  to the heart rate attained. I  consider this to be a low risk stress test. Because of the limited imaging via u/s due to his COPD.\par \par Now, at  one year s/p PCI, will d/c Brilinta and continue ASA.\par \par 2) Smoking addiction--very poor prognosis with obesity, CAD, and inability to quit. Smoking excessively, still. I have encouraged him to retry Chantix; he will consider. I explained to him, in layman's terms, that if he continues to smoke, he will die. His COPD and sleep apnea (for which he uses CPAP at night) are very severe and I have expressed concerns over his prognosis and mortality.\par \par 3) Hyperlipidemia, on maximum statin\par \par 4) Decompensated COPD--centrilobar emphysema with acute decompensation--- advanced. Very ominous prognosis due to his continued smoking as well as the severity of his already existing emphysema. To followup with pulmonary. He was told to go to [facility]aria today for this he is pulmonologist. He likely will need antibiotics, nebulizers, and additional bronchodilators.\par 5) Alcohol/substance abuse\par \par 6) Chronic and  isolated PVCs. This probably relates to ongoing hypoxemia related to his decompensated COPD. \par \par Additional comments: I am very concerned about this patient's well being. He is clearly worse with each subsequent visit. He continues to smoke cigarettes heavily and is suffering from severe emphysema with hypoxemia. Following acute coronary syndrome and coronary artery stenting, he continues to smoke and is having ventricular ectopy. His COPD precludes protective beta blocker therapy. He has significant LV dysfunction and is not on a beta blocker because of his emphysema. Now, he is experimenting with cocaine.\par All this limits his prognosis. I have discussed this in detail with Mr. Jaramillo who is aware of these issues. He promises to follow up with his pulmonologist. \par

## 2019-09-19 NOTE — HISTORY OF PRESENT ILLNESS
[FreeTextEntry1] :  Follow up office visit, for this 53 year-old gentleman, son of long-term patient Tyrone Jaramillo, seen after Acte ACS in Sept 2018. \par        Scenario: He was hospitalized 9/21/18 at [facility] with chest pain, ST changes, and elevated\par        troponin. He was transferred to [place] for cath:\par        100% mid RCA; received MIRTA with excellent result and ANA 3 flow.\par        Severe mid--LXC stenosis; received MIRTA to mid--lcx with good result\par        60% distal LAD; not addressed as felt nonobstructive\par        LV: EF 40% with inferior hypokinesis\par        Discharged on Brilinta 90 BID, ASA 81 and statin.\par        ==============================================\par      \par        Johnny is suffering from several conditions:\par        1)The first is a cough , and significant dyspnea.  ** I referred him to Pulmonary (Dr. Elio Travis) who diagnosed Centrilobular EMPHYSEMA, Sleep Apnea, Obesity, and cigarette addiction. He recommended full PFTs, CT of chest, and pulm stress test.\par        2) The second issue is peripheral edema. He was tried on hydrochlorothiazide without much success without rash. Because of this rash, he self discontinued hydrochlorothiazide, metformin, and simvastatin. He did retry at all 3 medications and the rash recurred. He is therefore unsure which of the 3 medications produced a rash. We started Lasix and this has helped.\par        3) He does have a history of hyperlipidemia and was prescribed simvastatin which he had discontinued (as above).\par        4) He is a 2 pack per day lifelong smoker. Tried Chantix for 3 months;no success--back to 2-3 PPD. He continues to smoke and does not manifest interest in quitting.\par        5) He does drink. He was formerly a heavy drinker, then stop for 20 years, but resumed several years ago.\par        6) Prior to his Sept 2018 ACS, and based on his risk factor profile (Male, smoker, htn, hyperlipidemia, and positive family hx of CAD in his father), I had recommended stress testing for risk stratification. 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 No acute pleural or pulmonary pathology.\par        ECHOCARDIOGRAM (9/27/17)\par        Mild LV dilatation with nl EF\par        Grade 1 diastolic dysfunction\par        No AS. Mild MR. No pulm Htn,\par        **Venous Duplex (obtained due to edema) 11/2/17: Nl study. No venous insuff or DVT.\par        ================================================\par  He did have f/u Stress Echo  (May 24, 2919):\par For just 4 mins, 52 secs, to HR of just 131 (79% MPHR)--test stopped due to Fatigue, dyspnea\par No diagnostic ST changes to HR obtained.\par Frequent PVCs\par Normal resting wall motion\par Technically limited Imaging due to body habitus and COPD: Resting \par   and post-exercise echo images are of marginal quality \par ------------------------------------------------------------------------------------------------------------------------------\par \par \par ***He is not doing well. \par He has been suffering from significant congestion. He has been wheezing and coughing and reports bringing up discolored sputum. He is feeling more short of breath. He continues to smoke, as usual.\par In addition, he reports that on several occasions recently he has been using cocaine.\par

## 2019-09-19 NOTE — PHYSICAL EXAM
[General Appearance - Well Developed] : well developed [Well Groomed] : well groomed [Normal Appearance] : normal appearance [General Appearance - Well Nourished] : well nourished [General Appearance - In No Acute Distress] : no acute distress [No Deformities] : no deformities [Normal Conjunctiva] : the conjunctiva exhibited no abnormalities [Eyelids - No Xanthelasma] : the eyelids demonstrated no xanthelasmas [Normal Oral Mucosa] : normal oral mucosa [No Oral Pallor] : no oral pallor [No Oral Cyanosis] : no oral cyanosis [Normal Jugular Venous A Waves Present] : normal jugular venous A waves present [Normal Jugular Venous V Waves Present] : normal jugular venous V waves present [No Jugular Venous Sims A Waves] : no jugular venous sims A waves [Respiration, Rhythm And Depth] : normal respiratory rhythm and effort [Exaggerated Use Of Accessory Muscles For Inspiration] : no accessory muscle use [Abdomen Soft] : soft [Abdomen Tenderness] : non-tender [Abdomen Mass (___ Cm)] : no abdominal mass palpated [Abnormal Walk] : normal gait [Gait - Sufficient For Exercise Testing] : the gait was sufficient for exercise testing [Nail Clubbing] : no clubbing of the fingernails [Cyanosis, Localized] : no localized cyanosis [Petechial Hemorrhages (___cm)] : no petechial hemorrhages [Skin Color & Pigmentation] : normal skin color and pigmentation [] : no rash [No Venous Stasis] : no venous stasis [Skin Lesions] : no skin lesions [No Skin Ulcers] : no skin ulcer [No Xanthoma] : no  xanthoma was observed [5th Left ICS - MCL] : palpated at the 5th LICS in the midclavicular line [Normal] : normal [Normal Rate] : normal [Premature Beats] : regular with premature beats [Normal S1] : normal S1 [Normal S2] : normal S2 [No Murmur] : no murmurs heard [No Abnormalities] : the abdominal aorta was not enlarged and no bruit was heard [No Pitting Edema] : no pitting edema present [FreeTextEntry1] : there is expiratory wheezing [Click] : no click [Right Carotid Bruit] : no bruit heard over the right carotid [Left Carotid Bruit] : no bruit heard over the left carotid [Right Femoral Bruit] : no bruit heard over the right femoral artery [Left Femoral Bruit] : no bruit heard over the left femoral artery [Rt] : no varicose veins of the right leg [Lt] : no varicose veins of the left leg

## 2019-09-19 NOTE — REASON FOR VISIT
[FreeTextEntry1] : 1. Multivessel CAD. \par 2. S/P ACS, IMRTA to RCA and LCX 9/21/18.\par 3. Hyperlipidemia. \par 4. Essential Hypertension.

## 2019-09-20 LAB
ALBUMIN SERPL ELPH-MCNC: 4.3 G/DL
ALP BLD-CCNC: 89 U/L
ALT SERPL-CCNC: 36 U/L
ANION GAP SERPL CALC-SCNC: 10 MMOL/L
AST SERPL-CCNC: 25 U/L
BASOPHILS # BLD AUTO: 0.07 K/UL
BASOPHILS NFR BLD AUTO: 0.8 %
BILIRUB SERPL-MCNC: 0.4 MG/DL
BUN SERPL-MCNC: 11 MG/DL
CALCIUM SERPL-MCNC: 9.1 MG/DL
CHLORIDE SERPL-SCNC: 105 MMOL/L
CHOLEST SERPL-MCNC: 137 MG/DL
CHOLEST/HDLC SERPL: 4.2 RATIO
CO2 SERPL-SCNC: 25 MMOL/L
CREAT SERPL-MCNC: 0.75 MG/DL
EOSINOPHIL # BLD AUTO: 0.66 K/UL
EOSINOPHIL NFR BLD AUTO: 7.2 %
GLUCOSE SERPL-MCNC: 160 MG/DL
HCT VFR BLD CALC: 51.5 %
HDLC SERPL-MCNC: 33 MG/DL
HGB BLD-MCNC: 16.2 G/DL
IMM GRANULOCYTES NFR BLD AUTO: 0.3 %
LDLC SERPL CALC-MCNC: 76 MG/DL
LYMPHOCYTES # BLD AUTO: 1.97 K/UL
LYMPHOCYTES NFR BLD AUTO: 21.5 %
MAN DIFF?: NORMAL
MCHC RBC-ENTMCNC: 31.5 GM/DL
MCHC RBC-ENTMCNC: 31.8 PG
MCV RBC AUTO: 101 FL
MONOCYTES # BLD AUTO: 0.55 K/UL
MONOCYTES NFR BLD AUTO: 6 %
NEUTROPHILS # BLD AUTO: 5.88 K/UL
NEUTROPHILS NFR BLD AUTO: 64.2 %
PLATELET # BLD AUTO: 194 K/UL
POTASSIUM SERPL-SCNC: 4.4 MMOL/L
PROT SERPL-MCNC: 6.9 G/DL
RBC # BLD: 5.1 M/UL
RBC # FLD: 13.3 %
SODIUM SERPL-SCNC: 140 MMOL/L
TRIGL SERPL-MCNC: 140 MG/DL
WBC # FLD AUTO: 9.16 K/UL

## 2019-12-12 ENCOUNTER — APPOINTMENT (OUTPATIENT)
Dept: CARDIOLOGY | Facility: CLINIC | Age: 54
End: 2019-12-12
Payer: COMMERCIAL

## 2019-12-26 PROBLEM — I25.811 CORONARY ATHEROSCLEROSIS OF NATIVE ARTERY OF TRANSPLANTED HEART: Status: ACTIVE | Noted: 2019-04-04

## 2019-12-26 PROBLEM — J43.9 EMPHYSEMA: Status: ACTIVE | Noted: 2019-12-26

## 2019-12-26 PROBLEM — I25.811 CORONARY ARTERY DISEASE INVOLVING NATIVE ARTERY OF TRANSPLANTED HEART WITHOUT ANGINA PECTORIS: Status: ACTIVE | Noted: 2018-12-24

## 2019-12-26 NOTE — ASSESSMENT
[Coronary Atherosclerosis of Native Coronary Artery (414.01\I25.10)] : total [FreeTextEntry1] :  Assessment: \par 1. S/P drug eluting coronary stent placement - Z95.5 (Primary)  \par 2. SOB (shortness of breath) - R06.02  \par 3. 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 Obstructive sleep apnea (adult) (pediatric) - G47.33  \par 5. Obesity (BMI 30-39.9) - E66.9  \par 6. Smoker - F17.200  \par 7. Alcohol abuse - F10.10  \par 8. Mixed hyperlipidemia - E78.2   \par \par Discussion:\par  1) CAD, 1 YEAR s/p ACS and s/p MIRTA X2 to mid RCA and LCX in Dec 2018, with known nonobstructive distal LAD stenosis and mild LV dysfunction. Asymptomatic .  Stress test is of limited diagnostic help due to poor imaging. However, there were no symptoms of chest discomfort and no ST-T changes  to the heart rate attained. I  consider this to be a low risk stress test. Because of the limited imaging via u/s due to his COPD.\par Now, beyond one year s/p PCI,he is off  Brilinta, but  continues ASA.\par \par 2) Smoking addiction--very poor prognosis with obesity, CAD, and inability to quit. Smoking excessively, still. I have encouraged him to retry Chantix; he will consider. I explained to him, in layman's terms, that if he continues to smoke, he will die. His COPD and sleep apnea (for which he uses CPAP at night) are very severe and I have expressed concerns over his prognosis and mortality.\par \par 3) Hyperlipidemia, on maximum statin\par \par 4) Decompensated COPD--centrilobar emphysema with acute decompensation--- advanced. Very ominous prognosis due to his continued smoking as well as the severity of his already existing emphysema. To followup with pulmonary. He was told to go to urticaria today for this he is pulmonologist. He likely will need antibiotics, nebulizers, and additional bronchodilators.\par 5) Alcohol/substance abuse\par \par 6) Chronic and  isolated PVCs. This probably relates to ongoing hypoxemia related to his decompensated COPD. \par \par Additional comments: I am very concerned about this patient's well being. He is clearly worse with each subsequent visit. He continues to smoke cigarettes heavily and is suffering from severe emphysema with hypoxemia. Following acute coronary syndrome and coronary artery stenting, he continues to smoke and is having ventricular ectopy. His COPD precludes protective beta blocker therapy. He has significant LV dysfunction and is not on a beta blocker because of his emphysema. Now, he is experimenting with cocaine.\par All this limits his prognosis. I have discussed this in detail with Mr. Jaramillo who is aware of these issues. He promises to follow up with his pulmonologist. \par

## 2019-12-26 NOTE — PHYSICAL EXAM
[General Appearance - Well Developed] : well developed [Normal Appearance] : normal appearance [Well Groomed] : well groomed [General Appearance - Well Nourished] : well nourished [No Deformities] : no deformities [General Appearance - In No Acute Distress] : no acute distress [Eyelids - No Xanthelasma] : the eyelids demonstrated no xanthelasmas [Normal Conjunctiva] : the conjunctiva exhibited no abnormalities [Normal Oral Mucosa] : normal oral mucosa [No Oral Pallor] : no oral pallor [No Oral Cyanosis] : no oral cyanosis [Normal Jugular Venous A Waves Present] : normal jugular venous A waves present [Normal Jugular Venous V Waves Present] : normal jugular venous V waves present [No Jugular Venous Sims A Waves] : no jugular venous sims A waves [Exaggerated Use Of Accessory Muscles For Inspiration] : no accessory muscle use [Respiration, Rhythm And Depth] : normal respiratory rhythm and effort [Abdomen Tenderness] : non-tender [Abdomen Soft] : soft [Abdomen Mass (___ Cm)] : no abdominal mass palpated [Abnormal Walk] : normal gait [Gait - Sufficient For Exercise Testing] : the gait was sufficient for exercise testing [Nail Clubbing] : no clubbing of the fingernails [Petechial Hemorrhages (___cm)] : no petechial hemorrhages [Cyanosis, Localized] : no localized cyanosis [Skin Color & Pigmentation] : normal skin color and pigmentation [] : no rash [No Venous Stasis] : no venous stasis [Skin Lesions] : no skin lesions [No Xanthoma] : no  xanthoma was observed [No Skin Ulcers] : no skin ulcer [5th Left ICS - MCL] : palpated at the 5th LICS in the midclavicular line [Normal] : normal [Normal Rate] : normal [Premature Beats] : regular with premature beats [Normal S1] : normal S1 [Normal S2] : normal S2 [No Murmur] : no murmurs heard [No Abnormalities] : the abdominal aorta was not enlarged and no bruit was heard [No Pitting Edema] : no pitting edema present [FreeTextEntry1] : there is expiratory wheezing [Click] : no click [Right Carotid Bruit] : no bruit heard over the right carotid [Left Carotid Bruit] : no bruit heard over the left carotid [Right Femoral Bruit] : no bruit heard over the right femoral artery [Left Femoral Bruit] : no bruit heard over the left femoral artery [Rt] : no varicose veins of the right leg [Lt] : no varicose veins of the left leg

## 2019-12-26 NOTE — HISTORY OF PRESENT ILLNESS
[FreeTextEntry1] :  Follow up office visit, for this 53 year-old gentleman, son of long-term patient Tyrone Jaramillo, seen after Acte ACS in Sept 2018. \par        Scenario: He was hospitalized 9/21/18 at [facility] with chest pain, ST changes, and elevated\par        troponin. He was transferred to [place] for cath:\par        100% mid RCA; received MIRTA with excellent result and ANA 3 flow.\par        Severe mid--LXC stenosis; received MIRTA to mid--lcx with good result\par        60% distal LAD; not addressed as felt nonobstructive\par        LV: EF 40% with inferior hypokinesis\par        Discharged on Brilinta 90 BID, ASA 81 and statin.\par        ==============================================\par      \par        Johnny is suffering from several conditions:\par        1)The first is a cough , and significant dyspnea.  ** I referred him to Pulmonary (Dr. Elio Travis) who diagnosed Centrilobular EMPHYSEMA, Sleep Apnea, Obesity, and cigarette addiction. He recommended full PFTs, CT of chest, and pulm stress test.\par        2) The second issue is peripheral edema. He was tried on hydrochlorothiazide without much success without rash. Because of this rash, he self discontinued hydrochlorothiazide, metformin, and simvastatin. He did retry at all 3 medications and the rash recurred. He is therefore unsure which of the 3 medications produced a rash. We started Lasix and this has helped.\par        3) He does have a history of hyperlipidemia and was prescribed simvastatin which he had discontinued (as above).\par        4) He is a 2 pack per day lifelong smoker. Tried Chantix for 3 months;no success--back to 2-3 PPD. He continues to smoke and does not manifest interest in quitting.\par        5) He does drink. He was formerly a heavy drinker, then stop for 20 years, but resumed several years ago.\par        6) Prior to his Sept 2018 ACS, and based on his risk factor profile (Male, smoker, htn, hyperlipidemia, and positive family hx of CAD in his father), I had recommended stress testing for risk stratification. 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 No acute pleural or pulmonary pathology.\par        ECHOCARDIOGRAM (9/27/17)\par        Mild LV dilatation with nl EF\par        Grade 1 diastolic dysfunction\par        No AS. Mild MR. No pulm Htn,\par        **Venous Duplex (obtained due to edema) 11/2/17: Nl study. No venous insuff or DVT.\par        ================================================\par  He did have f/u Stress Echo  (May 24, 2919):\par For just 4 mins, 52 secs, to HR of just 131 (79% MPHR)--test stopped due to Fatigue, dyspnea\par No diagnostic ST changes to HR obtained.\par Frequent PVCs\par Normal resting wall motion\par Technically limited Imaging due to body habitus and COPD: Resting \par   and post-exercise echo images are of marginal quality \par ------------------------------------------------------------------------------------------------------------------------------\par \par \par ***He is not doing well. \par He has been suffering from significant congestion. He has been wheezing and coughing and reports bringing up discolored sputum. He is feeling more short of breath. He continues to smoke, as usual.\par In addition, he reports that on several occasions recently he has been using cocaine.\par

## 2020-01-02 ENCOUNTER — APPOINTMENT (OUTPATIENT)
Dept: CARDIOLOGY | Facility: CLINIC | Age: 55
End: 2020-01-02

## 2020-01-02 DIAGNOSIS — J43.9 EMPHYSEMA, UNSPECIFIED: ICD-10-CM

## 2020-01-02 DIAGNOSIS — I25.811 ATHEROSCLEROSIS OF NATIVE CORONARY ARTERY OF TRANSPLANTED HEART W/OUT ANGINA PECTORIS: ICD-10-CM

## 2021-10-07 ENCOUNTER — APPOINTMENT (OUTPATIENT)
Dept: CARDIOLOGY | Facility: CLINIC | Age: 56
End: 2021-10-07
Payer: COMMERCIAL

## 2021-10-07 ENCOUNTER — RESULT REVIEW (OUTPATIENT)
Age: 56
End: 2021-10-07

## 2021-10-07 ENCOUNTER — NON-APPOINTMENT (OUTPATIENT)
Age: 56
End: 2021-10-07

## 2021-10-07 VITALS
HEIGHT: 69 IN | DIASTOLIC BLOOD PRESSURE: 80 MMHG | SYSTOLIC BLOOD PRESSURE: 130 MMHG | WEIGHT: 272 LBS | BODY MASS INDEX: 40.29 KG/M2

## 2021-10-07 DIAGNOSIS — R60.9 EDEMA, UNSPECIFIED: ICD-10-CM

## 2021-10-07 PROCEDURE — 99215 OFFICE O/P EST HI 40 MIN: CPT

## 2021-10-07 PROCEDURE — 93000 ELECTROCARDIOGRAM COMPLETE: CPT

## 2021-10-07 PROCEDURE — 36415 COLL VENOUS BLD VENIPUNCTURE: CPT

## 2021-10-07 NOTE — PHYSICAL EXAM
[General Appearance - Well Developed] : well developed [Normal Appearance] : normal appearance [Well Groomed] : well groomed [General Appearance - Well Nourished] : well nourished [No Deformities] : no deformities [General Appearance - In No Acute Distress] : no acute distress [Normal Conjunctiva] : the conjunctiva exhibited no abnormalities [Eyelids - No Xanthelasma] : the eyelids demonstrated no xanthelasmas [Normal Oral Mucosa] : normal oral mucosa [No Oral Pallor] : no oral pallor [No Oral Cyanosis] : no oral cyanosis [Normal Jugular Venous A Waves Present] : normal jugular venous A waves present [Normal Jugular Venous V Waves Present] : normal jugular venous V waves present [No Jugular Venous Sims A Waves] : no jugular venous sims A waves [Respiration, Rhythm And Depth] : normal respiratory rhythm and effort [Exaggerated Use Of Accessory Muscles For Inspiration] : no accessory muscle use [Abdomen Soft] : soft [Abdomen Tenderness] : non-tender [Abdomen Mass (___ Cm)] : no abdominal mass palpated [Abnormal Walk] : normal gait [Gait - Sufficient For Exercise Testing] : the gait was sufficient for exercise testing [Nail Clubbing] : no clubbing of the fingernails [Cyanosis, Localized] : no localized cyanosis [Petechial Hemorrhages (___cm)] : no petechial hemorrhages [Skin Color & Pigmentation] : normal skin color and pigmentation [] : no rash [No Venous Stasis] : no venous stasis [Skin Lesions] : no skin lesions [No Skin Ulcers] : no skin ulcer [No Xanthoma] : no  xanthoma was observed [5th Left ICS - MCL] : palpated at the 5th LICS in the midclavicular line [Normal] : normal [Normal Rate] : normal [Premature Beats] : regular with premature beats [Normal S1] : normal S1 [Normal S2] : normal S2 [No Murmur] : no murmurs heard [No Abnormalities] : the abdominal aorta was not enlarged and no bruit was heard [No Pitting Edema] : no pitting edema present [FreeTextEntry1] : there is expiratory wheezing [Click] : no click [Right Carotid Bruit] : no bruit heard over the right carotid [Left Carotid Bruit] : no bruit heard over the left carotid [Right Femoral Bruit] : no bruit heard over the right femoral artery [Left Femoral Bruit] : no bruit heard over the left femoral artery [Rt] : no varicose veins of the right leg [Lt] : no varicose veins of the left leg

## 2021-10-07 NOTE — ASSESSMENT
[FreeTextEntry1] :  Assessment: \par 1. S/P drug eluting coronary stent placement - Z95.5 (Primary)  \par 2. SOB (shortness of breath) - R06.02  \par 3. 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 Obstructive sleep apnea (adult) (pediatric) - G47.33  \par 5. Obesity (BMI 30-39.9) - E66.9  \par 6. Smoker - F17.200  \par 7. Alcohol abuse - F10.10  \par 8. Mixed hyperlipidemia - E78.2   \par \par Discussion:\par  1) CAD, s/p ACS and s/p MIRTA X2 to mid RCA and LCX in Dec 2018, with known nonobstructive distal LAD stenosis and mild LV dysfunction. Asymptomatic . \par -Recent cardiac catheterization revealed nonobstructive disease, patent stents, and no need for additional revascularization. Medical therapy was elected that the patient discontinued his medications for unclear reasons.\par \par 2)Dyspnea now most likely due to  Decompensated COPD--centrilobar emphysema with acute decompensation--- advanced. Very ominous prognosis . He has not seen his pulmonologist. \par 3) Alcohol/substance abuse\par 4) Chronic and  isolated PVCs. This probably relates to ongoing hypoxemia related to his decompensated COPD. \par 5) Type II diabetes, noncompliant with meds, weight, diet, and exercise. Worrisome\par \par Plan: Stat CXR to r/o CHF. Pneumonia, etc\par          Draw CBC, BNP, CMP, Lipids, A1c\par          Sent in Rxs for Furosemide 40, Lisinopril 10, and Atorvastatin 80--all of which he was on before\par          Phone f/u tomorrow--i will call him\par \par **Over 1 hour was spent with this patient, updating hx, performing PE, and reviewing records, scheduling tests, renewing meds, and a prolonged discussion on his negative health habits\par \par \par \par Additional comments: I am very concerned about this patient's well being. He is clearly worse with each subsequent visit. He continues to  suffer  from severe emphysema with hypoxemia. His COPD precludes protective beta blocker therapy. He has significant LV dysfunction and is not on a beta blocker because of his emphysema. Now, he is experimenting with cocaine.\par All this limits his prognosis. I have discussed this in detail with Tanya Clint who is aware of these issues. He promises to follow up with his pulmonologist. \par

## 2021-10-07 NOTE — HISTORY OF PRESENT ILLNESS
[FreeTextEntry1] :  Follow up office visit, for this 56 year-old gentleman, son of long-term patient Tyrone Jaramillo, seen after Acte ACS in Sept 2018. Then lost to f/u and not seen since 9/19/2019--2 years\par \par        Scenario: He was hospitalized 9/21/18 at [facility] with chest pain, ST changes, and elevated\par        troponin. He was transferred to [place] for cath:\par        100% mid RCA; received MIRTA with excellent result and ANA 3 flow.\par        Severe mid--LXC stenosis; received MIRTA to mid--lcx with good result\par        60% distal LAD; not addressed as felt nonobstructive\par        LV: EF 40% with inferior hypokinesis\par        Discharged on Brilinta 90 BID, ASA 81 and statin.\par        ==============================================\par      \par        Johnny is suffering from several CHRONIC conditions:\par        1)The first is a cough , and significant dyspnea.  ** I referred him to Pulmonary (Dr. Elio Travis) who diagnosed Centrilobular EMPHYSEMA, Sleep Apnea, Obesity, and cigarette addiction. He recommended full PFTs, CT of chest, and pulm stress test.\par        2) The second issue is peripheral edema. He was tried on hydrochlorothiazide without much success without rash. Because of this rash, he self discontinued hydrochlorothiazide, metformin, and simvastatin. He did retry at all 3 medications and the rash recurred. He is therefore unsure which of the 3 medications produced a rash. We started Lasix and this has helped.\par        3) He does have a history of hyperlipidemia and was prescribed simvastatin which he had discontinued (as above).\par        4) He is a 2 pack per day lifelong smoker. Tried Chantix for 3 months;no success--back to 2-3 PPD. He continues to smoke and does not manifest interest in quitting.\par        5) He does drink. He was formerly a heavy drinker, then stop for 20 years, but resumed several years ago.\par        6) Prior to his Sept 2018 ACS, and based on his risk factor profile (Male, smoker, htn, hyperlipidemia, and positive family hx of CAD in his father), I had recommended stress testing for risk stratification. 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 No acute pleural or pulmonary pathology.\par        ECHOCARDIOGRAM (9/27/17)\par        Mild LV dilatation with nl EF\par        Grade 1 diastolic dysfunction\par        No AS. Mild MR. No pulm Htn,\par        **Venous Duplex (obtained due to edema) 11/2/17: Nl study. No venous insuff or DVT.\par        ================================================\par  He did have f/u Stress Echo  (May 24, 2919):\par For just 4 mins, 52 secs, to HR of just 131 (79% MPHR)--test stopped due to Fatigue, dyspnea\par No diagnostic ST changes to HR obtained.\par Frequent PVCs\par Normal resting wall motion\par Technically limited Imaging due to body habitus and COPD: Resting \par   and post-exercise echo images are of marginal quality \par ------------------------------------------------------------------------------------------------------------------------------\par \par Currently: He returns today b/c his wife is concerned by increasing peripheral edema.\par \par ***He spent much of his time down in [place], residing with his now 89-year-old father. While in [place] the patient reports that he had a cardiac catheterization ago. He did not require further stents and he was told that everything was stable.\par --However, the patient discontinued lisinopril--he does not recall why. He is using furosemide daily for peripheral edema. He also ran out of aspirin and atorvastatin. He has some old simvastatin pills at home which he started recently. \par He's had no chest pain or chest pressure.\par However, last night he felt short of breath. His wife reports that he was wheezing and gurgling. He took some Lasix and felt better. He presents today.\par He is still having respiratory issues --he is  wheezing and coughing and reports bringing up discolored sputum. He is feeling more short of breath. He DID STOP SMOKING< but has been vaping\par In addition, he reports that on several occasions recently he has been using cocaine.\par

## 2021-10-11 LAB
ALBUMIN SERPL ELPH-MCNC: 4.4 G/DL
ALP BLD-CCNC: 94 U/L
ALT SERPL-CCNC: 20 U/L
ANION GAP SERPL CALC-SCNC: 13 MMOL/L
AST SERPL-CCNC: 18 U/L
BASOPHILS # BLD AUTO: 0.07 K/UL
BASOPHILS NFR BLD AUTO: 0.7 %
BILIRUB SERPL-MCNC: 0.2 MG/DL
BUN SERPL-MCNC: 8 MG/DL
CALCIUM SERPL-MCNC: 9.5 MG/DL
CHLORIDE SERPL-SCNC: 102 MMOL/L
CHOLEST SERPL-MCNC: 152 MG/DL
CO2 SERPL-SCNC: 25 MMOL/L
CREAT SERPL-MCNC: 0.99 MG/DL
EOSINOPHIL # BLD AUTO: 0.39 K/UL
EOSINOPHIL NFR BLD AUTO: 4 %
GLUCOSE SERPL-MCNC: 99 MG/DL
HCT VFR BLD CALC: 47.3 %
HDLC SERPL-MCNC: 29 MG/DL
HGB BLD-MCNC: 15.3 G/DL
IMM GRANULOCYTES NFR BLD AUTO: 0.6 %
LDLC SERPL CALC-MCNC: 89 MG/DL
LYMPHOCYTES # BLD AUTO: 2.74 K/UL
LYMPHOCYTES NFR BLD AUTO: 28.2 %
MAN DIFF?: NORMAL
MCHC RBC-ENTMCNC: 31.5 PG
MCHC RBC-ENTMCNC: 32.3 GM/DL
MCV RBC AUTO: 97.5 FL
MONOCYTES # BLD AUTO: 0.64 K/UL
MONOCYTES NFR BLD AUTO: 6.6 %
NEUTROPHILS # BLD AUTO: 5.8 K/UL
NEUTROPHILS NFR BLD AUTO: 59.9 %
NONHDLC SERPL-MCNC: 123 MG/DL
NT-PROBNP SERPL-MCNC: 311 PG/ML
PLATELET # BLD AUTO: 267 K/UL
POTASSIUM SERPL-SCNC: 4.3 MMOL/L
PROT SERPL-MCNC: 7.6 G/DL
RBC # BLD: 4.85 M/UL
RBC # FLD: 12.4 %
SODIUM SERPL-SCNC: 140 MMOL/L
TRIGL SERPL-MCNC: 173 MG/DL
WBC # FLD AUTO: 9.7 K/UL

## 2021-12-09 PROBLEM — E66.9 OBESITY: Status: ACTIVE | Noted: 2018-12-24

## 2021-12-09 NOTE — REVIEW OF SYSTEMS
Clinical Pharmacy Services: Medication History    Benjamin Connolly is a 26 y.o. male presenting to [facility] for   Chief Complaint   Patient presents with   • Abdominal Pain     intermittent lower Abd pain with nausea since tuesday.        He  has no past medical history on file.    Allergies as of 02/07/2019   • (No Known Allergies)       Medication information was obtained from: Patient  Pharmacy and Phone Number: [facility] [phone]    Prior to Admission Medications     Prescriptions Last Dose Informant Patient Reported? Taking?    Biotin 1 MG capsule  Self Yes Yes    Take 1 capsule by mouth Daily.    finasteride (PROPECIA) 1 MG tablet  Self Yes Yes    Take 1 mg by mouth Daily.    Multiple Vitamins-Minerals (MENS MULTI VITAMIN & MINERAL) tablet  Self Yes Yes    Take 1 tablet by mouth Daily.            Medication notes: Biotin and Multivitamin added per patient.    This medication list is complete to the best of my knowledge as of 2/7/2019    Please call if questions.    Margaux Leal, Medication History Technician  2/7/2019 7:16 PM       [Negative] : Heme/Lymph

## 2021-12-16 ENCOUNTER — APPOINTMENT (OUTPATIENT)
Dept: CARDIOLOGY | Facility: CLINIC | Age: 56
End: 2021-12-16
Payer: COMMERCIAL

## 2021-12-16 VITALS
WEIGHT: 280 LBS | BODY MASS INDEX: 41.47 KG/M2 | TEMPERATURE: 98.7 F | OXYGEN SATURATION: 97 % | DIASTOLIC BLOOD PRESSURE: 66 MMHG | SYSTOLIC BLOOD PRESSURE: 130 MMHG | HEART RATE: 40 BPM | HEIGHT: 69 IN

## 2021-12-16 DIAGNOSIS — E66.9 OBESITY, UNSPECIFIED: ICD-10-CM

## 2021-12-16 PROCEDURE — 99214 OFFICE O/P EST MOD 30 MIN: CPT

## 2021-12-16 NOTE — ASSESSMENT
[FreeTextEntry1] :  Assessment: \par 1. S/P drug eluting coronary stent placement - Z95.5 (Primary)  \par 2. SOB (shortness of breath) - R06.02  \par 3. 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 Obstructive sleep apnea (adult) (pediatric) - G47.33  \par 5. Obesity (BMI 30-39.9) - E66.9  \par 6. Smoker - F17.200  \par 7. Alcohol abuse - F10.10  \par 8. Mixed hyperlipidemia - E78.2   \par \par Discussion:\par  1) CAD, s/p ACS and s/p MIRTA X2 to mid RCA and LCX in Dec 2018, with known nonobstructive distal LAD stenosis and mild LV dysfunction. Asymptomatic . \par -Recent cardiac catheterization revealed nonobstructive disease, patent stents, and no need for additional revascularization. Medical therapy was elected that the patient discontinued his medications for unclear reasons.\par \par 2)Dyspnea now most likely due to  Decompensated COPD--centrilobar emphysema with acute decompensation--- advanced. Very ominous prognosis . He has not seen his pulmonologist. \par 3) Alcohol/substance abuse\par 4) Chronic and  isolated PVCs. This probably relates to ongoing hypoxemia related to his decompensated COPD. \par 5) Type II diabetes, noncompliant with meds, weight, diet, and exercise. Worrisome\par \par \par \par **Over 1 hour was spent with this patient, updating hx, performing PE, and reviewing records, scheduling tests, renewing meds, and a prolonged discussion on his negative health habits\par \par Additional comments: I am very concerned about this patient's well being. He is clearly worse with each subsequent visit. He continues to  suffer  from severe emphysema with hypoxemia. His COPD precludes protective beta blocker therapy. He has significant LV dysfunction and is not on a beta blocker because of his emphysema. Now, he is still smoking and drinking,experimenting with cocaine, and non-compliant with meds.\par All this limits his prognosis. I have discussed this in detail with Mr. Jaramillo who is aware of these issues. He promises to follow up with his pulmonologist. \par

## 2021-12-16 NOTE — HISTORY OF PRESENT ILLNESS
[FreeTextEntry1] :  Follow up office visit, for this 56 year-old gentleman, son of long-term patient Tyrone Jaramillo,with past hx of  ACS in Sept 2018. Then lost to f/u and not seen since 9/19/2019, until Oct 2021. He returns again.\par \par        Scenario: He was hospitalized 9/21/18 at [facility] with chest pain, ST changes, and elevated\par        troponin. He was transferred to [place] for cath:\par        100% mid RCA; received MIRTA with excellent result and ANA 3 flow.\par        Severe mid--LXC stenosis; received MIRTA to mid--lcx with good result\par        60% distal LAD; not addressed as felt nonobstructive\par        LV: EF 40% with inferior hypokinesis\par     ==============================================\par      \par        Johnny is suffering from several CHRONIC conditions:\par        1)The first is a cough , and significant dyspnea.  ** I referred him to Pulmonary (Dr. Elio Travis) who diagnosed Centrilobular EMPHYSEMA, Sleep Apnea, Obesity, and cigarette addiction. He recommended full PFTs, CT of chest, and pulm stress test.\par        2) The second issue is peripheral edema. He was tried on hydrochlorothiazide without much success but developed rash. Because of this rash, he self discontinued hydrochlorothiazide, metformin, and simvastatin. He did retry at all 3 medications and the rash recurred. He is therefore unsure which of the 3 medications produced a rash. We started Lasix and this has helped.\par        3) He does have a history of hyperlipidemia and was prescribed simvastatin which he had discontinued (as above).\par        4) He is a 2 pack per day lifelong smoker. Tried Chantix for 3 months;no success--back to 2-3 PPD. He continues to smoke and does not manifest interest in quitting.\par        5) He does drink. 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 No acute pleural or pulmonary pathology.\par        ECHOCARDIOGRAM (9/27/17)\par        Mild LV dilatation with nl EF\par        Grade 1 diastolic dysfunction\par        No AS. Mild MR. No pulm Htn,\par        **Venous Duplex (obtained due to edema) 11/2/17: Nl study. No venous insuff or DVT.\par =========================================================================\par  He did have f/u Stress Echo  (May 24, 2919):\par For just 4 mins, 52 secs, to HR of just 131 (79% MPHR)--test stopped due to Fatigue, dyspnea\par No diagnostic ST changes to HR obtained.\par Frequent PVCs\par Normal resting wall motion\par Technically limited Imaging due to body habitus and COPD: Resting \par   and post-exercise echo images are of marginal quality \par ------------------------------------------------------------------------------------------------------------------------------\par \par Currently: He returns today b/c his wife is concerned by increasing peripheral edema.\par \par ***He spent much of his time down in [place], residing with his now 89--year-old father. While in [place] the patient reports that he had a cardiac catheterizatio. He did not require further stents and he was told that everything was stable.\par --However, the patient discontinued lisinopril--he does not recall why. He is using furosemide daily for peripheral edema. He also ran out of aspirin and atorvastatin. He has some old simvastatin pills at home which he started recently. \par He's had no chest pain or chest pressure.\par \par He is still having respiratory issues --he is  wheezing and coughing and reports bringing up discolored sputum. He is feeling more short of breath. He DID STOP SMOKING but has been vaping\par In addition, he reports that on several occasions recently he has been using cocaine.\par

## 2022-04-14 NOTE — HISTORY OF PRESENT ILLNESS
[FreeTextEntry1] :  Follow up office visit, for this 56 year-old gentleman, son of long-term patient Tyrone Jaramillo,with past hx of  ACS in Sept 2018. Then lost to f/u and not seen since 9/19/2019, until Oct 2021. He returns again.\par \par        Scenario: He was hospitalized 9/21/18 at [facility] with chest pain, ST changes, and elevated\par        troponin. He was transferred to [place] for cath:\par        100% mid RCA; received MIRTA with excellent result and ANA 3 flow.\par        Severe mid--LXC stenosis; received MIRTA to mid--lcx with good result\par        60% distal LAD; not addressed as felt nonobstructive\par        LV: EF 40% with inferior hypokinesis\par     ==============================================\par      \par        Johnny is suffering from several CHRONIC conditions:\par        1)The first is a cough , and significant dyspnea.  ** I referred him to Pulmonary (Dr. Elio Travis) who diagnosed Centrilobular EMPHYSEMA, Sleep Apnea, Obesity, and cigarette addiction. He recommended full PFTs, CT of chest, and pulm stress test.\par        2) The second issue is peripheral edema. He was tried on hydrochlorothiazide without much success but developed rash. Because of this rash, he self discontinued hydrochlorothiazide, metformin, and simvastatin. He did retry at all 3 medications and the rash recurred. He is therefore unsure which of the 3 medications produced a rash. We started Lasix and this has helped.\par        3) He does have a history of hyperlipidemia and was prescribed simvastatin which he had discontinued (as above).\par        4) He is a 2 pack per day lifelong smoker. Tried Chantix for 3 months;no success--back to 2-3 PPD. He continues to smoke and does not manifest interest in quitting.\par        5) He does drink. 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 No acute pleural or pulmonary pathology.\par        ECHOCARDIOGRAM (9/27/17)\par        Mild LV dilatation with nl EF\par        Grade 1 diastolic dysfunction\par        No AS. Mild MR. No pulm Htn,\par        **Venous Duplex (obtained due to edema) 11/2/17: Nl study. No venous insuff or DVT.\par =========================================================================\par  He did have f/u Stress Echo  (May 24, 2919):\par For just 4 mins, 52 secs, to HR of just 131 (79% MPHR)--test stopped due to Fatigue, dyspnea\par No diagnostic ST changes to HR obtained.\par Frequent PVCs\par Normal resting wall motion\par Technically limited Imaging due to body habitus and COPD: Resting \par   and post-exercise echo images are of marginal quality \par ------------------------------------------------------------------------------------------------------------------------------\par \par Currently: He returns today b/c his wife is concerned by increasing peripheral edema.\par \par ***He spent much of his time down in [place], residing with his now 89--year-old father. While in [place] the patient reports that he had a cardiac catheterizatio. He did not require further stents and he was told that everything was stable.\par --However, the patient discontinued lisinopril--he does not recall why. He is using furosemide daily for peripheral edema. He also ran out of aspirin and atorvastatin. He has some old simvastatin pills at home which he started recently. \par He's had no chest pain or chest pressure.\par \par He is still having respiratory issues --he is  wheezing and coughing and reports bringing up discolored sputum. He is feeling more short of breath. He DID STOP SMOKING but has been vaping\par In addition, he reports that on several occasions recently he has been using cocaine.\par

## 2022-04-21 ENCOUNTER — APPOINTMENT (OUTPATIENT)
Dept: CARDIOLOGY | Facility: CLINIC | Age: 57
End: 2022-04-21

## 2022-05-22 ENCOUNTER — RX RENEWAL (OUTPATIENT)
Age: 57
End: 2022-05-22

## 2022-05-22 RX ORDER — FUROSEMIDE 40 MG/1
40 TABLET ORAL DAILY
Qty: 90 | Refills: 3 | Status: ACTIVE | COMMUNITY
Start: 2022-05-22 | End: 1900-01-01

## 2022-05-27 ENCOUNTER — RX RENEWAL (OUTPATIENT)
Age: 57
End: 2022-05-27

## 2022-06-01 ENCOUNTER — RX RENEWAL (OUTPATIENT)
Age: 57
End: 2022-06-01

## 2022-06-01 RX ORDER — LISINOPRIL 10 MG/1
10 TABLET ORAL DAILY
Qty: 90 | Refills: 2 | Status: ACTIVE | COMMUNITY
Start: 2022-05-27 | End: 1900-01-01

## 2022-06-23 NOTE — HISTORY OF PRESENT ILLNESS
[FreeTextEntry1] :  Follow up office visit, for this 56 year-old gentleman, son of long-term patient Tyrone Jaramillo,with past hx of  ACS in Sept 2018. Then lost to f/u and not seen since 9/19/2019, until Oct 2021. He returns again.\par \par        Scenario: He was hospitalized 9/21/18 at [facility] with chest pain, ST changes, and elevated\par        troponin. He was transferred to [place] for cath:\par        100% mid RCA; received MIRTA with excellent result and ANA 3 flow.\par        Severe mid--LXC stenosis; received MIRTA to mid--lcx with good result\par        60% distal LAD; not addressed as felt nonobstructive\par        LV: EF 40% with inferior hypokinesis\par     ==============================================\par      \par        Johnny is suffering from several CHRONIC conditions:\par        1)The first is a cough , and significant dyspnea.  ** I referred him to Pulmonary (Dr. Elio Travis) who diagnosed Centrilobular EMPHYSEMA, Sleep Apnea, Obesity, and cigarette addiction. He recommended full PFTs, CT of chest, and pulm stress test.\par        2) The second issue is peripheral edema. He was tried on hydrochlorothiazide without much success but developed rash. Because of this rash, he self discontinued hydrochlorothiazide, metformin, and simvastatin. He did retry at all 3 medications and the rash recurred. He is therefore unsure which of the 3 medications produced a rash. We started Lasix and this has helped.\par        3) He does have a history of hyperlipidemia and was prescribed simvastatin which he had discontinued (as above).\par        4) He is a 2 pack per day lifelong smoker. Tried Chantix for 3 months;no success--back to 2-3 PPD. He continues to smoke and does not manifest interest in quitting.\par        5) He does drink. 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 No acute pleural or pulmonary pathology.\par        ECHOCARDIOGRAM (9/27/17)\par        Mild LV dilatation with nl EF\par        Grade 1 diastolic dysfunction\par        No AS. Mild MR. No pulm Htn,\par        **Venous Duplex (obtained due to edema) 11/2/17: Nl study. No venous insuff or DVT.\par =========================================================================\par  He did have f/u Stress Echo  (May 24, 2919):\par For just 4 mins, 52 secs, to HR of just 131 (79% MPHR)--test stopped due to Fatigue, dyspnea\par No diagnostic ST changes to HR obtained.\par Frequent PVCs\par Normal resting wall motion\par Technically limited Imaging due to body habitus and COPD: Resting \par   and post-exercise echo images are of marginal quality \par ------------------------------------------------------------------------------------------------------------------------------\par \par Currently: He returns today b/c his wife is concerned by increasing peripheral edema.\par \par ***He spent much of his time down in [place], residing with his now 89--year-old father. While in [place] the patient reports that he had a cardiac catheterization. He did not require further stents and he was told that everything was stable.\par --However, the patient discontinued lisinopril--he does not recall why. He is using furosemide daily for peripheral edema. He also ran out of aspirin and atorvastatin. He has some old simvastatin pills at home which he started recently. \par He's had no chest pain or chest pressure.\par \par He is still having respiratory issues --he is  wheezing and coughing and reports bringing up discolored sputum. He is feeling more short of breath. He DID STOP SMOKING but has been vaping\par In addition, he reports that on several occasions recently he has been using cocaine.\par

## 2022-06-23 NOTE — REASON FOR VISIT
[FreeTextEntry1] : 1. Multivessel CAD. \par 2. S/P ACS, MIRTA to RCA and LCX 9/21/18.\par 3. Hyperlipidemia. \par 4. Essential Hypertension. \par 5. Centrilobular Emphysema--advanced

## 2022-06-30 ENCOUNTER — APPOINTMENT (OUTPATIENT)
Dept: CARDIOLOGY | Facility: CLINIC | Age: 57
End: 2022-06-30

## 2022-08-18 PROBLEM — R06.00 DYSPNEA: Status: ACTIVE | Noted: 2019-01-10

## 2022-08-25 ENCOUNTER — APPOINTMENT (OUTPATIENT)
Dept: CARDIOLOGY | Facility: CLINIC | Age: 57
End: 2022-08-25

## 2022-08-25 ENCOUNTER — NON-APPOINTMENT (OUTPATIENT)
Age: 57
End: 2022-08-25

## 2022-08-25 VITALS
SYSTOLIC BLOOD PRESSURE: 112 MMHG | OXYGEN SATURATION: 94 % | RESPIRATION RATE: 18 BRPM | DIASTOLIC BLOOD PRESSURE: 64 MMHG | WEIGHT: 259 LBS | HEART RATE: 61 BPM | BODY MASS INDEX: 38.25 KG/M2 | TEMPERATURE: 98.5 F

## 2022-08-25 DIAGNOSIS — R06.00 DYSPNEA, UNSPECIFIED: ICD-10-CM

## 2022-08-25 PROCEDURE — 99214 OFFICE O/P EST MOD 30 MIN: CPT | Mod: 25

## 2022-08-25 PROCEDURE — 93000 ELECTROCARDIOGRAM COMPLETE: CPT

## 2022-08-25 RX ORDER — ATORVASTATIN CALCIUM 80 MG/1
80 TABLET, FILM COATED ORAL
Qty: 90 | Refills: 3 | Status: DISCONTINUED | COMMUNITY
Start: 2019-06-13 | End: 2022-08-25

## 2022-08-25 RX ORDER — ROSUVASTATIN CALCIUM 40 MG/1
40 TABLET, FILM COATED ORAL DAILY
Qty: 90 | Refills: 3 | Status: ACTIVE | COMMUNITY
Start: 2022-08-25 | End: 1900-01-01

## 2022-08-25 NOTE — HISTORY OF PRESENT ILLNESS
[FreeTextEntry1] :  Follow up office visit, for this 57 year-old gentleman, son of long-term patient Tyrone aJramillo,with past hx of  ACS in Sept 2018. Then lost to f/u and not seen since 9/19/2019, until Oct 2021. He returns again.\par \par        Scenario: He was hospitalized 9/21/18 at [facility] with chest pain, ST changes, and elevated\par        troponin. He was transferred to [place] for cath:\par        100% mid RCA; received MIRTA with excellent result and ANA 3 flow.\par        Severe mid--LXC stenosis; received MIRTA to mid--lcx with good result\par        60% distal LAD; not addressed as felt nonobstructive\par        LV: EF 40% with inferior hypokinesis\par     ==============================================\par      \par        Johnny is suffering from several CHRONIC conditions:\par        1)The first is a cough , and significant dyspnea.  ** I referred him to Pulmonary (Dr. Elio Travis) who diagnosed Centrilobular EMPHYSEMA, Sleep Apnea, Obesity, and cigarette addiction. He recommended full PFTs, CT of chest, and pulm stress test.\par        2) The second issue is peripheral edema. He was tried on hydrochlorothiazide without much success but developed rash. Because of this rash, he self discontinued hydrochlorothiazide, metformin, and simvastatin. He did retry at all 3 medications and the rash recurred. He is therefore unsure which of the 3 medications produced a rash. We started Lasix and this has helped.\par        3) He does have a history of hyperlipidemia and was prescribed simvastatin which he had discontinued (as above).\par        4) He is a 2 pack per day lifelong smoker. Tried Chantix for 3 months;no success--back to 2-3 PPD. He continues to smoke and does not manifest interest in quitting.\par        5) He does drink. 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 No acute pleural or pulmonary pathology.\par        ECHOCARDIOGRAM (9/27/17)\par        Mild LV dilatation with nl EF\par        Grade 1 diastolic dysfunction\par        No AS. Mild MR. No pulm Htn,\par        **Venous Duplex (obtained due to edema) 11/2/17: Nl study. No venous insuff or DVT.\par =========================================================================\par  He did have f/u Stress Echo  (May 24, 2919):\par For just 4 mins, 52 secs, to HR of just 131 (79% MPHR)--test stopped due to Fatigue, dyspnea\par No diagnostic ST changes to HR obtained.\par Frequent PVCs\par Normal resting wall motion\par Technically limited Imaging due to body habitus and COPD: Resting \par   and post-exercise echo images are of marginal quality \par ------------------------------------------------------------------------------------------------------------------------------\par \par Currently: He returns today b/c his wife is concerned by increasing peripheral edema.\par \par ***He spent much of his time down in [place], residing with his now 89--year-old father. While in [place] the patient reports that he had a cardiac catheterization. He did not require further stents and he was told that everything was stable.\par --However, the patient discontinued lisinopril--he does not recall why. He is using furosemide daily for peripheral edema. He also ran out of aspirin and atorvastatin. He has some old simvastatin pills at home which he started recently. \par He's had no chest pain or chest pressure.\par \par He is still having respiratory issues --he is  wheezing and coughing and reports bringing up discolored sputum. He is feeling more short of breath. He DID STOP SMOKING but has been vaping\par In addition, he reports that on several occasions recently he has been using cocaine.\par \par \par NB: [facility]:\par Chol 159,, LDL 80, and rig 364   HDL 28\par CBC,  smac--nl\par \par AIC is 7.0\par He was switched from atorvastatin to rosuvastatin.\par

## 2022-12-15 PROBLEM — I25.5 ISCHEMIC CARDIOMYOPATHY: Status: ACTIVE | Noted: 2022-12-15

## 2022-12-22 ENCOUNTER — NON-APPOINTMENT (OUTPATIENT)
Age: 57
End: 2022-12-22

## 2022-12-22 ENCOUNTER — APPOINTMENT (OUTPATIENT)
Dept: CARDIOLOGY | Facility: CLINIC | Age: 57
End: 2022-12-22

## 2022-12-22 VITALS
WEIGHT: 257 LBS | HEIGHT: 69 IN | HEART RATE: 67 BPM | SYSTOLIC BLOOD PRESSURE: 114 MMHG | OXYGEN SATURATION: 98 % | BODY MASS INDEX: 38.06 KG/M2 | DIASTOLIC BLOOD PRESSURE: 70 MMHG

## 2022-12-22 DIAGNOSIS — I25.5 ISCHEMIC CARDIOMYOPATHY: ICD-10-CM

## 2022-12-22 PROCEDURE — 93000 ELECTROCARDIOGRAM COMPLETE: CPT

## 2022-12-22 PROCEDURE — 36415 COLL VENOUS BLD VENIPUNCTURE: CPT

## 2022-12-22 PROCEDURE — 99214 OFFICE O/P EST MOD 30 MIN: CPT | Mod: 25

## 2022-12-22 NOTE — HISTORY OF PRESENT ILLNESS
[FreeTextEntry1] :  Follow up office visit, for this 57 year-old gentleman, son of long-term patient Tyrone Jaramillo,with past hx of  ACS in Sept 2018. Then lost to f/u and not seen since 9/19/2019, until Oct 2021. He returns again.\par \par        Scenario: He was hospitalized 9/21/18 at [facility] with chest pain, ST changes, and elevated\par        troponin. He was transferred to [place] for cath:\par        100% mid RCA; received MIRTA with excellent result and ANA 3 flow.\par        Severe mid--LXC stenosis; received MIRTA to mid--lcx with good result\par        60% distal LAD; not addressed as felt nonobstructive\par        LV: EF 40% with inferior hypokinesis\par     ==============================================\par      \par        Johnny is suffering from severe CHRONIC conditions:\par        1)The first is a cough , and significant dyspnea.  ** I referred him to Pulmonary (Dr. Elio Travis) who diagnosed Centrilobular EMPHYSEMA, Sleep Apnea, Obesity, and cigarette addiction. He recommended full PFTs, CT of chest, and pulm stress test.\par        2) The second issue is peripheral edema. He was tried on hydrochlorothiazide without much success but developed rash. Because of this rash, he self discontinued hydrochlorothiazide, metformin, and simvastatin. He did retry at all 3 medications and the rash recurred. He is therefore unsure which of the 3 medications produced a rash. We started Lasix and this has helped.\par        3) He does have a history of hyperlipidemia and was prescribed simvastatin which he had discontinued (as above).\par        4) He is a 2 pack per day lifelong smoker. Tried Chantix for 3 months;no success--back to 2-3 PPD. He continues to smoke and does not manifest interest in quitting.\par        5) He does drink. 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 No acute pleural or pulmonary pathology.\par        ECHOCARDIOGRAM (9/27/17)\par        Mild LV dilatation with nl EF\par        Grade 1 diastolic dysfunction\par        No AS. Mild MR. No pulm Htn,\par        **Venous Duplex (obtained due to edema) 11/2/17: Nl study. No venous insuff or DVT.\par =========================================================================\par  He did have f/u Stress Echo  (May 24, 2919):\par For just 4 mins, 52 secs, to HR of just 131 (79% MPHR)--test stopped due to Fatigue, dyspnea\par No diagnostic ST changes to HR obtained.\par Frequent PVCs\par Normal resting wall motion\par Technically limited Imaging due to body habitus and COPD: Resting \par   and post-exercise echo images are of marginal quality \par ------------------------------------------------------------------------------------------------------------------------------\par \par Currently: He returns today b/c his wife is concerned by increasing peripheral edema.\par \par ***He spent much of his time down in [place], residing with his now 89--year-old father. While in [place] the patient reports that he had a cardiac catheterization. He did not require further stents and he was told that everything was stable.\par --However, the patient discontinued lisinopril--he does not recall why. He is using furosemide daily for peripheral edema. He also ran out of aspirin and atorvastatin. He has some old simvastatin pills at home which he started recently. \par He's had no chest pain or chest pressure.\par \par He is still having respiratory issues --he is  wheezing and coughing and reports bringing up discolored sputum. He is feeling more short of breath. He DID STOP SMOKING but has been vaping\par In addition, he reports that on several occasions recently he has been using cocaine.\par \par \par NB: [facility]:\par Chol 159,, LDL 80, and rig 364   HDL 28\par CBC,  smac--nl\par \par AIC is 7.0\par He was switched from atorvastatin to rosuvastatin.\par

## 2022-12-22 NOTE — ASSESSMENT
[FreeTextEntry1] :  Assessment: \par 1. S/P drug eluting coronary stent placement - Z95.5 (Primary)  \par 2. SOB (shortness of breath) - R06.02  \par 3. 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 Obstructive sleep apnea (adult) (pediatric) - G47.33  \par 5. Obesity (BMI 30-39.9) - E66.9  \par 6. Smoker - F17.200  \par 7. Alcohol abuse - F10.10  \par 8. Mixed hyperlipidemia - E78.2   \par \par Discussion:\par  1) CAD, s/p ACS and s/p MIRTA X2 to mid RCA and LCX in Dec 2018, with known nonobstructive distal LAD stenosis and mild LV dysfunction. Asymptomatic . \par -Recent cardiac catheterization revealed nonobstructive disease, patent stents, and no need for additional revascularization. Medical therapy was elected that the patient discontinued his medications for unclear reasons.\par \par 2)Dyspnea now most likely due to  Decompensated COPD--centrilobar emphysema with acute decompensation--- advanced. Very ominous prognosis . He has not seen his pulmonologist. \par 3) Alcohol/substance abuse\par 4) Chronic and  isolated PVCs. This probably relates to ongoing hypoxemia related to his decompensated COPD. \par 5) Type II diabetes, noncompliant with meds, weight, diet, and exercise. Worrisome\par \par \par \par *again, cautioned him with a prolonged discussion on his negative health habits\par \par Additional comments: I am very concerned about this patient's well being. He is clearly worse with each subsequent visit. He continues to  suffer  from severe emphysema with hypoxemia. His COPD precludes protective beta blocker therapy. He has significant LV dysfunction and is not on a beta blocker because of his emphysema. Now, he is still smoking and drinking,experimenting with cocaine, and non-compliant with meds.\par All this limits his prognosis. I have discussed this in detail with Mr. Jaramillo who is aware of these issues. He promises to follow up with his pulmonologist. \par

## 2022-12-23 LAB
ALBUMIN SERPL ELPH-MCNC: 4.6 G/DL
ALP BLD-CCNC: 74 U/L
ALT SERPL-CCNC: 32 U/L
ANION GAP SERPL CALC-SCNC: 11 MMOL/L
AST SERPL-CCNC: 21 U/L
BASOPHILS # BLD AUTO: 0.04 K/UL
BASOPHILS NFR BLD AUTO: 0.4 %
BILIRUB SERPL-MCNC: 0.3 MG/DL
BUN SERPL-MCNC: 8 MG/DL
CALCIUM SERPL-MCNC: 9.4 MG/DL
CHLORIDE SERPL-SCNC: 102 MMOL/L
CHOLEST SERPL-MCNC: 190 MG/DL
CO2 SERPL-SCNC: 26 MMOL/L
CREAT SERPL-MCNC: 0.89 MG/DL
EGFR: 99 ML/MIN/1.73M2
EOSINOPHIL # BLD AUTO: 0.16 K/UL
EOSINOPHIL NFR BLD AUTO: 1.7 %
GLUCOSE SERPL-MCNC: 154 MG/DL
HCT VFR BLD CALC: 50.2 %
HDLC SERPL-MCNC: 44 MG/DL
HGB BLD-MCNC: 16.6 G/DL
IMM GRANULOCYTES NFR BLD AUTO: 0.5 %
LDLC SERPL CALC-MCNC: 119 MG/DL
LYMPHOCYTES # BLD AUTO: 2.06 K/UL
LYMPHOCYTES NFR BLD AUTO: 21.5 %
MAN DIFF?: NORMAL
MCHC RBC-ENTMCNC: 31.9 PG
MCHC RBC-ENTMCNC: 33.1 GM/DL
MCV RBC AUTO: 96.5 FL
MONOCYTES # BLD AUTO: 0.46 K/UL
MONOCYTES NFR BLD AUTO: 4.8 %
NEUTROPHILS # BLD AUTO: 6.79 K/UL
NEUTROPHILS NFR BLD AUTO: 71.1 %
NONHDLC SERPL-MCNC: 146 MG/DL
PLATELET # BLD AUTO: 145 K/UL
POTASSIUM SERPL-SCNC: 4.5 MMOL/L
PROT SERPL-MCNC: 7.1 G/DL
RBC # BLD: 5.2 M/UL
RBC # FLD: 12.9 %
SODIUM SERPL-SCNC: 139 MMOL/L
TRIGL SERPL-MCNC: 133 MG/DL
WBC # FLD AUTO: 9.56 K/UL

## 2023-06-21 PROBLEM — I49.3 VENTRICULAR PREMATURE COMPLEXES: Status: ACTIVE | Noted: 2022-04-14

## 2023-06-21 PROBLEM — E11.9 NON-INSULIN DEPENDENT TYPE 2 DIABETES MELLITUS: Status: ACTIVE | Noted: 2022-08-25

## 2023-06-27 ENCOUNTER — APPOINTMENT (OUTPATIENT)
Dept: CARDIOLOGY | Facility: CLINIC | Age: 58
End: 2023-06-27

## 2023-06-27 DIAGNOSIS — I49.3 VENTRICULAR PREMATURE DEPOLARIZATION: ICD-10-CM

## 2023-06-27 DIAGNOSIS — E11.9 TYPE 2 DIABETES MELLITUS W/OUT COMPLICATIONS: ICD-10-CM

## 2023-06-27 NOTE — HISTORY OF PRESENT ILLNESS
[FreeTextEntry1] :  Follow up office visit, for this 57 year-old gentleman, son of long-term patient Tyrone Jaramillo,with past hx of  ACS in Sept 2018. Then lost to f/u and not seen since 9/19/2019, until Oct 2021. He returns again.\par \par        Scenario: He was hospitalized 9/21/18 at [facility] with chest pain, ST changes, and elevated\par        troponin. He was transferred to [place] for cath:\par        100% mid RCA; received MIRTA with excellent result and ANA 3 flow.\par        Severe mid--LXC stenosis; received MIRTA to mid--lcx with good result\par        60% distal LAD; not addressed as felt nonobstructive\par        LV: EF 40% with inferior hypokinesis\par     ==============================================\par      \par        Johnny is suffering from severe CHRONIC conditions:\par        1)The first is a cough , and significant dyspnea.  ** I referred him to Pulmonary (Dr. Elio Travis) who diagnosed Centrilobular EMPHYSEMA, Sleep Apnea, Obesity, and cigarette addiction. He recommended full PFTs, CT of chest, and pulm stress test.\par        2) The second issue is peripheral edema. He was tried on hydrochlorothiazide without much success but developed rash. Because of this rash, he self discontinued hydrochlorothiazide, metformin, and simvastatin. He did retry at all 3 medications and the rash recurred. He is therefore unsure which of the 3 medications produced a rash. We started Lasix and this has helped.\par        3) He does have a history of hyperlipidemia and was prescribed simvastatin which he had discontinued (as above).\par        4) He is a 2 pack per day lifelong smoker. Tried Chantix for 3 months;no success--back to 2-3 PPD. He continues to smoke and does not manifest interest in quitting.\par        5) He does drink. 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 No acute pleural or pulmonary pathology.\par        ECHOCARDIOGRAM (9/27/17)\par        Mild LV dilatation with nl EF\par        Grade 1 diastolic dysfunction\par        No AS. Mild MR. No pulm Htn,\par        **Venous Duplex (obtained due to edema) 11/2/17: Nl study. No venous insuff or DVT.\par =========================================================================\par  He did have f/u Stress Echo  (May 24, 2919):\par For just 4 mins, 52 secs, to HR of just 131 (79% MPHR)--test stopped due to Fatigue, dyspnea\par No diagnostic ST changes to HR obtained.\par Frequent PVCs\par Normal resting wall motion\par Technically limited Imaging due to body habitus and COPD: Resting \par   and post-exercise echo images are of marginal quality \par ------------------------------------------------------------------------------------------------------------------------------\par \par Currently: He returns today b/c his wife is concerned by increasing peripheral edema.\par \par ***He spent much of his time down in [place], residing with his now 89--year-old father. While in [place] the patient reports that he had a cardiac catheterization. He did not require further stents and he was told that everything was stable.\par --However, the patient discontinued lisinopril--he does not recall why. He is using furosemide daily for peripheral edema. He also ran out of aspirin and atorvastatin. He has some old simvastatin pills at home which he started recently. \par He's had no chest pain or chest pressure.\par \par He is still having respiratory issues --he is  wheezing and coughing and reports bringing up discolored sputum. He is feeling more short of breath. He DID STOP SMOKING but has been vaping\par In addition, he reports that on several occasions recently he has been using cocaine.\par \par \par NB: He [facility]:\par Chol 159,, LDL 80, and rig 364   HDL 28\par CBC,  smac--nl\par \par AIC is 7.0\par He was switched from atorvastatin to rosuvastatin.\par

## 2023-06-27 NOTE — PHYSICAL EXAM
[General Appearance - Well Developed] : well developed [Normal Appearance] : normal appearance [Well Groomed] : well groomed [General Appearance - Well Nourished] : well nourished [General Appearance - In No Acute Distress] : no acute distress [No Deformities] : no deformities [Normal Conjunctiva] : the conjunctiva exhibited no abnormalities [Normal Oral Mucosa] : normal oral mucosa [Eyelids - No Xanthelasma] : the eyelids demonstrated no xanthelasmas [No Oral Pallor] : no oral pallor [No Oral Cyanosis] : no oral cyanosis [Normal Jugular Venous A Waves Present] : normal jugular venous A waves present [Normal Jugular Venous V Waves Present] : normal jugular venous V waves present [No Jugular Venous Sims A Waves] : no jugular venous sims A waves [Respiration, Rhythm And Depth] : normal respiratory rhythm and effort [Exaggerated Use Of Accessory Muscles For Inspiration] : no accessory muscle use [Abdomen Soft] : soft [Abdomen Tenderness] : non-tender [Abdomen Mass (___ Cm)] : no abdominal mass palpated [Abnormal Walk] : normal gait [Gait - Sufficient For Exercise Testing] : the gait was sufficient for exercise testing [Nail Clubbing] : no clubbing of the fingernails [Cyanosis, Localized] : no localized cyanosis [Petechial Hemorrhages (___cm)] : no petechial hemorrhages [Skin Color & Pigmentation] : normal skin color and pigmentation [] : no rash [No Venous Stasis] : no venous stasis [Skin Lesions] : no skin lesions [No Skin Ulcers] : no skin ulcer [No Xanthoma] : no  xanthoma was observed [5th Left ICS - MCL] : palpated at the 5th LICS in the midclavicular line [Normal] : normal [Normal Rate] : normal [Premature Beats] : regular with premature beats [Normal S1] : normal S1 [Normal S2] : normal S2 [No Murmur] : no murmurs heard [No Abnormalities] : the abdominal aorta was not enlarged and no bruit was heard [No Pitting Edema] : no pitting edema present [FreeTextEntry1] : there is expiratory wheezing [Click] : no click [Right Carotid Bruit] : no bruit heard over the right carotid [Left Carotid Bruit] : no bruit heard over the left carotid [Right Femoral Bruit] : no bruit heard over the right femoral artery [Left Femoral Bruit] : no bruit heard over the left femoral artery [Rt] : no varicose veins of the right leg [Lt] : no varicose veins of the left leg

## 2023-07-27 ENCOUNTER — APPOINTMENT (OUTPATIENT)
Dept: CARDIOLOGY | Facility: CLINIC | Age: 58
End: 2023-07-27
Payer: COMMERCIAL

## 2023-07-27 VITALS
BODY MASS INDEX: 39.84 KG/M2 | HEART RATE: 78 BPM | DIASTOLIC BLOOD PRESSURE: 78 MMHG | HEIGHT: 69 IN | WEIGHT: 269 LBS | SYSTOLIC BLOOD PRESSURE: 100 MMHG | OXYGEN SATURATION: 96 %

## 2023-07-27 PROCEDURE — 99214 OFFICE O/P EST MOD 30 MIN: CPT | Mod: 25

## 2023-07-27 PROCEDURE — 93000 ELECTROCARDIOGRAM COMPLETE: CPT

## 2023-07-27 PROCEDURE — 36415 COLL VENOUS BLD VENIPUNCTURE: CPT

## 2023-07-27 RX ORDER — GLYCOPYRROLATE AND FORMOTEROL FUMARATE 9; 4.8 UG/1; UG/1
9-4.8 AEROSOL, METERED RESPIRATORY (INHALATION)
Qty: 1 | Refills: 5 | Status: DISCONTINUED | COMMUNITY
Start: 2019-05-15 | End: 2023-07-27

## 2023-07-27 RX ORDER — BUDESONIDE AND FORMOTEROL FUMARATE DIHYDRATE 80; 4.5 UG/1; UG/1
80-4.5 AEROSOL RESPIRATORY (INHALATION) TWICE DAILY
Qty: 10.2 | Refills: 5 | Status: DISCONTINUED | COMMUNITY
Start: 2021-12-16 | End: 2023-07-27

## 2023-07-27 RX ORDER — SILDENAFIL CITRATE 50 MG/1
50 TABLET, FILM COATED ORAL
Refills: 0 | Status: DISCONTINUED | COMMUNITY
End: 2023-07-27

## 2023-07-27 RX ORDER — VARENICLINE TARTRATE 0.5 (11)-1
0.5 MG X 11 & KIT ORAL
Qty: 1 | Refills: 3 | Status: DISCONTINUED | COMMUNITY
Start: 2019-06-27 | End: 2023-07-27

## 2023-07-27 RX ORDER — CYANOCOBALAMIN (VITAMIN B-12) 100 MCG
100 TABLET ORAL
Refills: 0 | Status: ACTIVE | COMMUNITY
Start: 2023-07-27

## 2023-07-27 NOTE — HISTORY OF PRESENT ILLNESS
[FreeTextEntry1] :  Follow up office visit, for this 58 year-old gentleman, son of long-term patient Tyrone Jaramillo,with past hx of  ACS in Sept 2018. Then lost to f/u and not seen since 9/19/2019, until Oct 2021. He returns again.\par \par        Scenario: He was hospitalized 9/21/18 at [facility] with chest pain, ST changes, and elevated\par        troponin. He was transferred to [place] for cath:\par        100% mid RCA; received MIRTA with excellent result and ANA 3 flow.\par        Severe mid--LXC stenosis; received MIRTA to mid--lcx with good result\par        60% distal LAD; not addressed as felt nonobstructive\par        LV: EF 40% with inferior hypokinesis\par     ==============================================\par      \par        Johnny is suffering from several CHRONIC conditions:\par        1)The first is a cough , and significant dyspnea.  ** I referred him to Pulmonary (Dr. Elio Travis) who diagnosed Centrilobular EMPHYSEMA, Sleep Apnea, Obesity, and cigarette addiction. He recommended full PFTs, CT of chest, and pulm stress test.\par        2) The second issue is peripheral edema. He was tried on hydrochlorothiazide without much success but developed rash. Because of this rash, he self discontinued hydrochlorothiazide, metformin, and simvastatin. He did retry at all 3 medications and the rash recurred. He is therefore unsure which of the 3 medications produced a rash. We started Lasix and this has helped.\par        3) He does have a history of hyperlipidemia and was prescribed simvastatin which he had discontinued (as above).\par        4) He is a 2 pack per day lifelong smoker. Tried Chantix for 3 months;no success--back to 2-3 PPD. He continues to smoke and does not manifest interest in quitting.\par        5) He does drink. 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 No acute pleural or pulmonary pathology.\par        ECHOCARDIOGRAM (9/27/17)\par        Mild LV dilatation with nl EF\par        Grade 1 diastolic dysfunction\par        No AS. Mild MR. No pulm Htn,\par        **Venous Duplex (obtained due to edema) 11/2/17: Nl study. No venous insuff or DVT.\par =========================================================================\par  He did have f/u Stress Echo  (May 24, 2919):\par For just 4 mins, 52 secs, to HR of just 131 (79% MPHR)--test stopped due to Fatigue, dyspnea\par No diagnostic ST changes to HR obtained.\par Frequent PVCs\par Normal resting wall motion\par Technically limited Imaging due to body habitus and COPD: Resting \par   and post-exercise echo images are of marginal quality \par ------------------------------------------------------------------------------------------------------------------------------\par \par Currently: He returns today b/c his wife is concerned by increasing peripheral edema.\par \par ***He spent much of his time down in [place], residing with his now 89--year-old father. While in [place] the patient reports that he had a cardiac catheterization. He did not require further stents and he was told that everything was stable.\par --However, the patient discontinued lisinopril--he does not recall why. He is using furosemide daily for peripheral edema. He also ran out of aspirin and atorvastatin. He has some old simvastatin pills at home which he started recently. \par He's had no chest pain or chest pressure.\par \par He is still having respiratory issues --he is  wheezing and coughing and reports bringing up discolored sputum. He is feeling more short of breath. He DID STOP SMOKING but has been vaping\par In addition, he reports that on several occasions recently he has been using cocaine.\par \par \par NB: [facility]:\par Chol 230   HDL  32   Trig 400   ldl 147\par CBC,  smac--nl\par \par AIC is 7.5\par He was switched from atorvastatin to rosuvastatin.But, he stopped his meds, again, for several months!!! He can offer no explanation. Just stopped. Back on it since labs\par -he was prescribed Jardiance\par -He stopped smoking, but is vaping heavily\par \par

## 2023-07-27 NOTE — ASSESSMENT
[FreeTextEntry1] :  Assessment: \par 1. S/P drug eluting coronary stent placement - Z95.5 (Primary)  \par 2. SOB (shortness of breath) - R06.02  \par 3. 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 Obstructive sleep apnea (adult) (pediatric) - G47.33  \par 5. Obesity (BMI 30-39.9) - E66.9  \par 6. Smoker - F17.200  \par 7. Alcohol abuse - F10.10  \par 8. Mixed hyperlipidemia - E78.2   \par \par Discussion:\par  1) CAD, s/p ACS and s/p MIRTA X2 to mid RCA and LCX in Dec 2018, with known nonobstructive distal LAD stenosis and mild LV dysfunction. Asymptomatic . \par -Recent cardiac catheterization revealed nonobstructive disease, patent stents, and no need for additional revascularization. Medical therapy was elected that the patient discontinued his medications for unclear reasons.\par \par 2)Dyspnea now most likely due to  Decompensated COPD--centrilobar emphysema with acute decompensation--- advanced. Very ominous prognosis . He has not seen his pulmonologist. \par \par 3) Alcohol/substance abuse\par \par 4) Chronic and  isolated PVCs. This probably relates to ongoing hypoxemia related to his decompensated COPD\par  \par 5) Type II diabetes, noncompliant with meds, weight, diet, and exercise. Worrisome\par \par \par \par *again, cautioned him with a prolonged discussion on his negative health habits\par \par Additional comments: I am very concerned about this patient's well being. He is clearly worse with each subsequent visit. He continues to  suffer  from severe emphysema with hypoxemia. His COPD precludes protective beta blocker therapy. He has significant LV dysfunction and is not on a beta blocker because of his emphysema. Now, he is still vaping and drinking, and non-compliant with meds.\par All this limits his prognosis. I have discussed this in detail with Mr. Jaramillo who is aware of these issues. He promises to follow up with his pulmonologist. He pledges to take his meds. \par

## 2023-12-14 PROBLEM — J43.2 CENTRILOBULAR EMPHYSEMA: Status: ACTIVE | Noted: 2018-12-24

## 2023-12-14 PROBLEM — E78.5 DYSLIPIDEMIA: Status: ACTIVE | Noted: 2019-01-10

## 2023-12-14 PROBLEM — I25.118 CORONARY ARTERY DISEASE WITH STABLE ANGINA PECTORIS: Status: ACTIVE | Noted: 2021-10-07

## 2023-12-14 PROBLEM — Z00.00 ENCOUNTER FOR PREVENTIVE HEALTH EXAMINATION: Status: ACTIVE | Noted: 2018-12-12

## 2023-12-14 PROBLEM — I10 HYPERTENSION: Status: ACTIVE | Noted: 2019-01-10

## 2023-12-14 PROBLEM — J44.9 COPD (CHRONIC OBSTRUCTIVE PULMONARY DISEASE): Status: ACTIVE | Noted: 2021-10-07

## 2023-12-14 PROBLEM — F17.200 SMOKING ADDICTION: Status: ACTIVE | Noted: 2018-12-24

## 2023-12-21 ENCOUNTER — APPOINTMENT (OUTPATIENT)
Dept: CARDIOLOGY | Facility: CLINIC | Age: 58
End: 2023-12-21
Payer: COMMERCIAL

## 2023-12-21 VITALS
DIASTOLIC BLOOD PRESSURE: 70 MMHG | HEIGHT: 68 IN | HEART RATE: 68 BPM | WEIGHT: 270 LBS | OXYGEN SATURATION: 95 % | BODY MASS INDEX: 40.92 KG/M2 | SYSTOLIC BLOOD PRESSURE: 122 MMHG

## 2023-12-21 DIAGNOSIS — E78.5 HYPERLIPIDEMIA, UNSPECIFIED: ICD-10-CM

## 2023-12-21 DIAGNOSIS — Z00.00 ENCOUNTER FOR GENERAL ADULT MEDICAL EXAMINATION W/OUT ABNORMAL FINDINGS: ICD-10-CM

## 2023-12-21 DIAGNOSIS — J44.9 CHRONIC OBSTRUCTIVE PULMONARY DISEASE, UNSPECIFIED: ICD-10-CM

## 2023-12-21 DIAGNOSIS — J43.2 CENTRILOBULAR EMPHYSEMA: ICD-10-CM

## 2023-12-21 DIAGNOSIS — I25.118 ATHEROSCLEROTIC HEART DISEASE OF NATIVE CORONARY ARTERY WITH OTHER FORMS OF ANGINA PECTORIS: ICD-10-CM

## 2023-12-21 DIAGNOSIS — F17.200 NICOTINE DEPENDENCE, UNSPECIFIED, UNCOMPLICATED: ICD-10-CM

## 2023-12-21 DIAGNOSIS — I10 ESSENTIAL (PRIMARY) HYPERTENSION: ICD-10-CM

## 2023-12-21 PROCEDURE — 99213 OFFICE O/P EST LOW 20 MIN: CPT | Mod: 25

## 2023-12-21 PROCEDURE — 93000 ELECTROCARDIOGRAM COMPLETE: CPT

## 2023-12-21 RX ORDER — EMPAGLIFLOZIN 25 MG/1
25 TABLET, FILM COATED ORAL DAILY
Qty: 90 | Refills: 0 | Status: ACTIVE | COMMUNITY

## 2023-12-21 RX ORDER — NICOTINE 4 MG/1
10 INHALANT RESPIRATORY (INHALATION)
Qty: 120 | Refills: 0 | Status: ACTIVE | COMMUNITY
Start: 2023-12-21 | End: 1900-01-01

## 2023-12-21 NOTE — ASSESSMENT
[FreeTextEntry1] : Assessment Encounter for preventive health examination (V70.0) (Z00.00) Coronary artery disease with stable angina pectoris (414.00,413.9) (I25.118) Hypertension (401.9) (I10) Dyslipidemia (272.4) (E78.5) COPD (chronic obstructive pulmonary disease) (496) (J44.9) Centrilobular emphysema (492.8) (J43.2) Assessment:  1. S/P drug eluting coronary stent placement - Z95.5 (Primary) 2. SOB (shortness of breath) - R06.02  3. Dependence on other enabling machines and devices - Z99.89 4. Obstructive sleep apnea (adult) (pediatric) - G47.33  5. Obesity (BMI 30-39.9) - E66.9  6. Smoker - F17.200  7. Alcohol abuse - F10.10  8. Mixed hyperlipidemia - E78.2  Discussion:   1) CAD, s/p ACS and s/p MIRTA X2 to mid RCA and LCX in Dec 2018, with known nonobstructive distal LAD stenosis and mild LV dysfunction. Asymptomatic. -Recent cardiac catheterization revealed nonobstructive disease, patent stents, and no need for additional revascularization. Medical therapy was elected that the patient discontinued his medications for unclear reasons.  2)Dyspnea now most likely due to Decompensated COPD--centriolar emphysema with acute decompensation--- advanced. Very ominous prognosis. He has not seen his pulmonologist.  3) Alcohol/substance abuse--?Off these except vaping continuously  4) Chronic and isolated PVCs. This probably relates to ongoing hypoxemia related to his decompensated COPD  5) Type II diabetes, noncompliant with meds, weight, diet, and exercise. Worrisome  *Again, cautioned him with a prolonged discussion on his negative health habits   All this limits his prognosis. I have discussed this in detail with Mr. Jaramillo who is aware of these issues. He promises to follow up with his pulmonologist. He pledges to take his meds.

## 2023-12-21 NOTE — HISTORY OF PRESENT ILLNESS
Continuous Positive Airway Pressure (CPAP) therapy was prescribed to you as a medical necessity and there are risks of discontinuing  use of the device, some of which may be long term  Symptoms you experienced before using CPAP may return such as snoring, apneas, excessive daytime sleepiness, hypertension, cardiac arrhythmias, risk of stroke, congestive heart-failure, exacerbation of COPD and potential respiratory failure  Ultimately, it is a personal decision for you to make if you continue use of an affected device or discontinue until a replacement is provided  Unfortunately, [employer] has not yet provided us with information about available devices  You can visit their website at www  [employer])/scr-update to register your device or www  [email or web address]  expertinquiry  com to learn more about how [facility] to replace your device  Another option would be to check with your medical equipment provider to determine if you are eligible for a new machine through your insurance, if not you can pay out of pocket for a new machine  According to [facility], an in line bacterial filter that can be purchased online for upwards of $4  can also be helpful in preventing inhalation of particles, but will not reduce volatile chemicals  If you wish to get a replacement machine, we are able to provide you with a script  P;ease include your mask type  Nursing Support:  When: Monday through Friday 7A-5PM except holidays  Where: Our direct line is [phone]  If you are having a true emergency please call 911  In the event that the line is busy or it is after hours please leave a voice message and we will return your call  Please speak clearly, leaving your full name, birth date, best number to reach you and the reason for your call  Medication refills: We will need the name of the medication, the dosage, the ordering provider, whether you get a 30 or 90 day refill, and the pharmacy name and address  Medications will be ordered by the provider only  Nurses cannot call in prescriptions  Please allow 7 days for medication refills  Physician requested updates: If your provider requested that you call with an update after starting medication, please be ready to provide us the medication and dosage, what time you take your medication, the time you attempt to fall asleep, time you fall asleep, when you wake up, and what time you get out of bed  Sleep Study Results: We will contact you with sleep study results and/or next steps after the physician has reviewed your testing  [FreeTextEntry1] :  Follow up office visit, for this 58 year-old gentleman, son of long-term patient Tyrone Jaramillo, with past hx of  ACS in Sept 2018. Then lost to f/u and not seen since 9/19/2019, until Oct 2021. He returns again.         Scenario: He was hospitalized 9/21/18 at [facility] with chest pain, ST changes, and elevated        troponin. He was transferred to [place] for Cath.:        100% mid RCA; received MIRTA with excellent result and ANA 3 flow.        Severe mid--LXC stenosis; received MIRTA to mid--lcx with good result.        60% distal LAD; not addressed as felt nonobstructive.        LV: EF 40% with inferior hypokinesis     ==============================================              Johnny is suffering from several CHRONIC conditions:        1)The first is a cough , and significant dyspnea.  ** I referred him to Pulmonary (Dr. Elio Travis) who diagnosed Centrilobular EMPHYSEMA, Sleep Apnea, Obesity, and cigarette addiction.         2) The second issue is peripheral edema. He was tried on hydrochlorothiazide without much success but developed rash. Because of this rash, he self-discontinued hydrochlorothiazide, metformin, and simvastatin. He did retry at all 3 medications and the rash recurred. He is therefore unsure which of the 3 medications produced a rash. We started Lasix and this has helped.        3) He does have a history of hyperlipidemia and was prescribed simvastatin which he had discontinued (as above).        4) He is a 2 pack per day lifelong smoker. Tried Chantix for 3 months; but no success--back to 2-3 PPD. He continues to smoke and does not manifest interest in quitting.        5) He does drink. He was formerly a heavy drinker, then stopped for 20 years, but resumed several years ago.     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        He has grown children, one of whom recently . ---------------------------------------------------------------------------------------------------------------        Testing:        -Chest x-ray dated August 3, 2017 was within normal limits. No acute pleural or pulmonary pathology.        ECHOCARDIOGRAM (9/27/17)        Mild LV dilatation with nl EF        Grade 1 diastolic dysfunction        No AS. Mild MR. No pulm Htn,        **Venous Duplex (obtained due to edema) 11/2/17: Nl study. No venous insuff or DVT. =========================================================================  He did have f/u Stress Echo  (May 24, 2919): For just 4 mins, 52 secs, to HR of just 131 (79% MPHR)--test stopped due to Fatigue, dyspnea No diagnostic ST changes to HR obtained. Frequent PVCs Normal resting wall motion Technically limited Imaging due to body habitus and COPD: Resting    and post-exercise echo images are of marginal quality  ------------------------------------------------------------------------------------------------------------------------------  Currently: He returns today b/c his wife is concerned by increasing peripheral edema.  ***He spent much of his time down in [place], residing with his now 89--year-old father. While in [place] the patient reports that he had a cardiac catheterization. He did not require further stents and he was told that everything was stable. --However, the patient discontinued lisinopril--he does not recall why. He is using furosemide daily for peripheral edema. He also ran out of aspirin and atorvastatin. He has some old simvastatin pills at home which he started recently.  He's had no chest pain or chest pressure.  He is still having respiratory issues --he is  wheezing and coughing and reports bringing up discolored sputum. He is feeling more short of breath. He DID STOP SMOKING but has been vaping--nearly continuously and is now on Nicotrol cartridges.  He also reports that he stopped alcohol and all drugs.  [facility]. He also saw a surgeon yesterday concerning bariatric weight loss surgery as well as repair of a large abdominal hernia.  He is considering his options.  NB: He reports that [facility]: Chol 230   HDL  32   Trig 400   ldl 147 CBC,  smac--nl  AIC is 7.5 He was switched from atorvastatin to rosuvastatin.But, he stopped his meds, again, for several months!!! He can offer no explanation. Just stopped. Back on it since labs -he was prescribed Jardiance -He stopped smoking, but is vaping heavily

## 2024-07-18 ENCOUNTER — NON-APPOINTMENT (OUTPATIENT)
Age: 59
End: 2024-07-18

## 2024-07-18 ENCOUNTER — APPOINTMENT (OUTPATIENT)
Dept: CARDIOLOGY | Facility: CLINIC | Age: 59
End: 2024-07-18
Payer: COMMERCIAL

## 2024-07-18 VITALS
HEIGHT: 68 IN | BODY MASS INDEX: 39.56 KG/M2 | HEART RATE: 83 BPM | OXYGEN SATURATION: 97 % | DIASTOLIC BLOOD PRESSURE: 80 MMHG | SYSTOLIC BLOOD PRESSURE: 130 MMHG | WEIGHT: 261 LBS

## 2024-07-18 DIAGNOSIS — E11.9 TYPE 2 DIABETES MELLITUS W/OUT COMPLICATIONS: ICD-10-CM

## 2024-07-18 DIAGNOSIS — G47.33 OBSTRUCTIVE SLEEP APNEA (ADULT) (PEDIATRIC): ICD-10-CM

## 2024-07-18 DIAGNOSIS — J43.2 CENTRILOBULAR EMPHYSEMA: ICD-10-CM

## 2024-07-18 DIAGNOSIS — I10 ESSENTIAL (PRIMARY) HYPERTENSION: ICD-10-CM

## 2024-07-18 DIAGNOSIS — Z00.00 ENCOUNTER FOR GENERAL ADULT MEDICAL EXAMINATION W/OUT ABNORMAL FINDINGS: ICD-10-CM

## 2024-07-18 DIAGNOSIS — E78.5 HYPERLIPIDEMIA, UNSPECIFIED: ICD-10-CM

## 2024-07-18 DIAGNOSIS — I25.118 ATHEROSCLEROTIC HEART DISEASE OF NATIVE CORONARY ARTERY WITH OTHER FORMS OF ANGINA PECTORIS: ICD-10-CM

## 2024-07-18 PROCEDURE — 99214 OFFICE O/P EST MOD 30 MIN: CPT | Mod: 25

## 2024-07-18 PROCEDURE — 93000 ELECTROCARDIOGRAM COMPLETE: CPT

## 2024-12-12 PROBLEM — E88.810 METABOLIC SYNDROME: Status: ACTIVE | Noted: 2024-12-12

## 2024-12-12 PROBLEM — G47.30 SLEEP APNEA: Status: ACTIVE | Noted: 2024-12-12

## 2024-12-19 ENCOUNTER — APPOINTMENT (OUTPATIENT)
Dept: CARDIOLOGY | Facility: CLINIC | Age: 59
End: 2024-12-19

## 2024-12-19 DIAGNOSIS — Z00.00 ENCOUNTER FOR GENERAL ADULT MEDICAL EXAMINATION W/OUT ABNORMAL FINDINGS: ICD-10-CM

## 2024-12-19 DIAGNOSIS — E78.5 HYPERLIPIDEMIA, UNSPECIFIED: ICD-10-CM

## 2024-12-19 DIAGNOSIS — G47.30 SLEEP APNEA, UNSPECIFIED: ICD-10-CM

## 2024-12-19 DIAGNOSIS — J43.2 CENTRILOBULAR EMPHYSEMA: ICD-10-CM

## 2024-12-19 DIAGNOSIS — E88.810 METABOLIC SYNDROME: ICD-10-CM

## 2024-12-19 DIAGNOSIS — I10 ESSENTIAL (PRIMARY) HYPERTENSION: ICD-10-CM

## 2024-12-19 DIAGNOSIS — I25.118 ATHEROSCLEROTIC HEART DISEASE OF NATIVE CORONARY ARTERY WITH OTHER FORMS OF ANGINA PECTORIS: ICD-10-CM

## 2024-12-19 DIAGNOSIS — F17.200 NICOTINE DEPENDENCE, UNSPECIFIED, UNCOMPLICATED: ICD-10-CM

## 2024-12-19 DIAGNOSIS — E11.9 TYPE 2 DIABETES MELLITUS W/OUT COMPLICATIONS: ICD-10-CM

## 2024-12-25 PROBLEM — F10.90 ALCOHOL USE: Status: ACTIVE | Noted: 2018-12-24
